# Patient Record
Sex: FEMALE | Race: OTHER | HISPANIC OR LATINO
[De-identification: names, ages, dates, MRNs, and addresses within clinical notes are randomized per-mention and may not be internally consistent; named-entity substitution may affect disease eponyms.]

---

## 2018-01-22 ENCOUNTER — NON-APPOINTMENT (OUTPATIENT)
Age: 28
End: 2018-01-22

## 2018-01-22 ENCOUNTER — APPOINTMENT (OUTPATIENT)
Dept: OBGYN | Facility: CLINIC | Age: 28
End: 2018-01-22
Payer: COMMERCIAL

## 2018-01-22 VITALS
DIASTOLIC BLOOD PRESSURE: 64 MMHG | SYSTOLIC BLOOD PRESSURE: 100 MMHG | HEIGHT: 62 IN | BODY MASS INDEX: 42.33 KG/M2 | WEIGHT: 230 LBS

## 2018-01-22 DIAGNOSIS — Z33.2 ENCOUNTER FOR ELECTIVE TERMINATION OF PREGNANCY: ICD-10-CM

## 2018-01-22 DIAGNOSIS — Z87.19 PERSONAL HISTORY OF OTHER DISEASES OF THE DIGESTIVE SYSTEM: ICD-10-CM

## 2018-01-22 DIAGNOSIS — Z78.9 OTHER SPECIFIED HEALTH STATUS: ICD-10-CM

## 2018-01-22 DIAGNOSIS — Z83.49 FAMILY HISTORY OF OTHER ENDOCRINE, NUTRITIONAL AND METABOLIC DISEASES: ICD-10-CM

## 2018-01-22 DIAGNOSIS — Z83.3 FAMILY HISTORY OF DIABETES MELLITUS: ICD-10-CM

## 2018-01-22 LAB
BILIRUB UR QL STRIP: NORMAL
GLUCOSE UR-MCNC: NORMAL
HCG UR QL: 0.2 EU/DL
HCG UR QL: POSITIVE
HGB UR QL STRIP.AUTO: NORMAL
KETONES UR-MCNC: NORMAL
LEUKOCYTE ESTERASE UR QL STRIP: NORMAL
NITRITE UR QL STRIP: NORMAL
PH UR STRIP: 7
PROT UR STRIP-MCNC: NORMAL
QUALITY CONTROL: YES
SP GR UR STRIP: 1.01

## 2018-01-22 PROCEDURE — 99385 PREV VISIT NEW AGE 18-39: CPT | Mod: 25

## 2018-01-22 PROCEDURE — 76817 TRANSVAGINAL US OBSTETRIC: CPT

## 2018-01-22 PROCEDURE — 81003 URINALYSIS AUTO W/O SCOPE: CPT | Mod: QW

## 2018-01-22 PROCEDURE — 36415 COLL VENOUS BLD VENIPUNCTURE: CPT

## 2018-01-22 PROCEDURE — 81025 URINE PREGNANCY TEST: CPT

## 2018-01-23 LAB
C TRACH RRNA SPEC QL NAA+PROBE: NOT DETECTED
N GONORRHOEA RRNA SPEC QL NAA+PROBE: NOT DETECTED
SOURCE AMPLIFICATION: NORMAL
SOURCE TP AMPLIFICATION: NORMAL
T VAGINALIS RRNA SPEC QL NAA+PROBE: NOT DETECTED

## 2018-01-24 LAB — BACTERIA UR CULT: NORMAL

## 2018-01-27 LAB — CYTOLOGY CVX/VAG DOC THIN PREP: NORMAL

## 2018-02-27 ENCOUNTER — APPOINTMENT (OUTPATIENT)
Dept: OBGYN | Facility: CLINIC | Age: 28
End: 2018-02-27
Payer: COMMERCIAL

## 2018-02-27 VITALS
BODY MASS INDEX: 42.33 KG/M2 | SYSTOLIC BLOOD PRESSURE: 112 MMHG | WEIGHT: 230 LBS | DIASTOLIC BLOOD PRESSURE: 70 MMHG | HEIGHT: 62 IN

## 2018-02-27 DIAGNOSIS — Z32.01 ENCOUNTER FOR PREGNANCY TEST, RESULT POSITIVE: ICD-10-CM

## 2018-02-27 LAB
BILIRUB UR QL STRIP: NORMAL
GLUCOSE UR-MCNC: NORMAL
HCG UR QL: 0.2 EU/DL
HGB UR QL STRIP.AUTO: NORMAL
KETONES UR-MCNC: NORMAL
LEUKOCYTE ESTERASE UR QL STRIP: NORMAL
NITRITE UR QL STRIP: NORMAL
PH UR STRIP: 6
PROT UR STRIP-MCNC: NORMAL
SP GR UR STRIP: 1.01

## 2018-02-27 PROCEDURE — 36415 COLL VENOUS BLD VENIPUNCTURE: CPT

## 2018-02-27 PROCEDURE — 76801 OB US < 14 WKS SINGLE FETUS: CPT

## 2018-02-27 PROCEDURE — 0502F SUBSEQUENT PRENATAL CARE: CPT

## 2018-02-28 ENCOUNTER — NON-APPOINTMENT (OUTPATIENT)
Age: 28
End: 2018-02-28

## 2018-02-28 PROBLEM — Z32.01 POSITIVE PREGNANCY TEST: Status: RESOLVED | Noted: 2018-01-22 | Resolved: 2018-02-28

## 2018-02-28 LAB
ABO + RH PNL BLD: NORMAL
B19V IGG SER QL IA: 6.8 INDEX
B19V IGG+IGM SER-IMP: NORMAL
B19V IGG+IGM SER-IMP: POSITIVE
B19V IGM FLD-ACNC: 0.1 INDEX
B19V IGM SER-ACNC: NEGATIVE
BASOPHILS # BLD AUTO: 0.02 K/UL
BASOPHILS NFR BLD AUTO: 0.3 %
BLD GP AB SCN SERPL QL: NORMAL
EOSINOPHIL # BLD AUTO: 0.12 K/UL
EOSINOPHIL NFR BLD AUTO: 1.7 %
HBV SURFACE AG SERPL QL IA: NONREACTIVE
HCT VFR BLD CALC: 36.4 %
HCV AB SER QL: NONREACTIVE
HCV S/CO RATIO: 0.1 S/CO
HGB BLD-MCNC: 11.5 G/DL
HIV1+2 AB SPEC QL IA.RAPID: NONREACTIVE
IMM GRANULOCYTES NFR BLD AUTO: 0.1 %
LYMPHOCYTES # BLD AUTO: 2.08 K/UL
LYMPHOCYTES NFR BLD AUTO: 29.5 %
MAN DIFF?: NORMAL
MCHC RBC-ENTMCNC: 30.6 PG
MCHC RBC-ENTMCNC: 31.6 GM/DL
MCV RBC AUTO: 96.8 FL
MONOCYTES # BLD AUTO: 0.51 K/UL
MONOCYTES NFR BLD AUTO: 7.2 %
NEUTROPHILS # BLD AUTO: 4.3 K/UL
NEUTROPHILS NFR BLD AUTO: 61.2 %
PLATELET # BLD AUTO: 356 K/UL
RBC # BLD: 3.76 M/UL
RBC # FLD: 14.8 %
RUBV IGG FLD-ACNC: 9.4 INDEX
RUBV IGG SER-IMP: POSITIVE
T PALLIDUM AB SER QL IA: NEGATIVE
TSH SERPL-ACNC: 1.39 UIU/ML
VZV AB TITR SER: POSITIVE
VZV IGG SER IF-ACNC: 2472 INDEX
WBC # FLD AUTO: 7.04 K/UL

## 2018-03-05 LAB
HGB A MFR BLD: 97.4 %
HGB A2 MFR BLD: 2.6 %
HGB FRACT BLD-IMP: NORMAL
LEAD BLD-MCNC: <1 UG/DL
ZIKA PCR SERUM: NOT DETECTED

## 2018-04-14 ENCOUNTER — APPOINTMENT (OUTPATIENT)
Dept: OBGYN | Facility: CLINIC | Age: 28
End: 2018-04-14
Payer: COMMERCIAL

## 2018-04-14 ENCOUNTER — NON-APPOINTMENT (OUTPATIENT)
Age: 28
End: 2018-04-14

## 2018-04-14 VITALS
WEIGHT: 228 LBS | HEIGHT: 62 IN | DIASTOLIC BLOOD PRESSURE: 74 MMHG | BODY MASS INDEX: 41.96 KG/M2 | SYSTOLIC BLOOD PRESSURE: 106 MMHG

## 2018-04-14 DIAGNOSIS — Z34.81 ENCOUNTER FOR SUPERVISION OF OTHER NORMAL PREGNANCY, FIRST TRIMESTER: ICD-10-CM

## 2018-04-14 LAB
BILIRUB UR QL STRIP: NORMAL
GLUCOSE UR-MCNC: NORMAL
HCG UR QL: 0.2 EU/DL
HGB UR QL STRIP.AUTO: NORMAL
KETONES UR-MCNC: NORMAL
LEUKOCYTE ESTERASE UR QL STRIP: NORMAL
NITRITE UR QL STRIP: NORMAL
PH UR STRIP: 6.5
PROT UR STRIP-MCNC: NORMAL
SP GR UR STRIP: 1.02

## 2018-04-14 PROCEDURE — 0502F SUBSEQUENT PRENATAL CARE: CPT

## 2018-04-14 PROCEDURE — 36415 COLL VENOUS BLD VENIPUNCTURE: CPT

## 2018-04-15 ENCOUNTER — NON-APPOINTMENT (OUTPATIENT)
Age: 28
End: 2018-04-15

## 2018-04-18 LAB
2ND TRIMESTER DATA: NORMAL
AFP PNL SERPL: NORMAL
AFP SERPL-ACNC: NORMAL
CLINICAL BIOCHEMIST REVIEW: NORMAL
NOTES NTD: NORMAL

## 2018-04-30 ENCOUNTER — APPOINTMENT (OUTPATIENT)
Dept: ANTEPARTUM | Facility: CLINIC | Age: 28
End: 2018-04-30
Payer: COMMERCIAL

## 2018-04-30 ENCOUNTER — ASOB RESULT (OUTPATIENT)
Age: 28
End: 2018-04-30

## 2018-04-30 PROCEDURE — 76811 OB US DETAILED SNGL FETUS: CPT

## 2018-05-11 ENCOUNTER — APPOINTMENT (OUTPATIENT)
Dept: ANTEPARTUM | Facility: CLINIC | Age: 28
End: 2018-05-11

## 2018-05-14 ENCOUNTER — NON-APPOINTMENT (OUTPATIENT)
Age: 28
End: 2018-05-14

## 2018-05-14 ENCOUNTER — APPOINTMENT (OUTPATIENT)
Dept: OBGYN | Facility: CLINIC | Age: 28
End: 2018-05-14
Payer: COMMERCIAL

## 2018-05-14 VITALS
SYSTOLIC BLOOD PRESSURE: 120 MMHG | WEIGHT: 233 LBS | HEIGHT: 62 IN | DIASTOLIC BLOOD PRESSURE: 70 MMHG | BODY MASS INDEX: 42.88 KG/M2

## 2018-05-14 PROCEDURE — 0502F SUBSEQUENT PRENATAL CARE: CPT

## 2018-05-19 ENCOUNTER — APPOINTMENT (OUTPATIENT)
Dept: ANTEPARTUM | Facility: CLINIC | Age: 28
End: 2018-05-19
Payer: COMMERCIAL

## 2018-05-19 ENCOUNTER — ASOB RESULT (OUTPATIENT)
Age: 28
End: 2018-05-19

## 2018-05-19 PROCEDURE — 76816 OB US FOLLOW-UP PER FETUS: CPT

## 2018-06-11 ENCOUNTER — APPOINTMENT (OUTPATIENT)
Dept: OBGYN | Facility: CLINIC | Age: 28
End: 2018-06-11
Payer: COMMERCIAL

## 2018-06-11 ENCOUNTER — NON-APPOINTMENT (OUTPATIENT)
Age: 28
End: 2018-06-11

## 2018-06-11 VITALS
HEIGHT: 62 IN | WEIGHT: 234 LBS | DIASTOLIC BLOOD PRESSURE: 70 MMHG | SYSTOLIC BLOOD PRESSURE: 110 MMHG | BODY MASS INDEX: 43.06 KG/M2

## 2018-06-11 LAB
BASOPHILS # BLD AUTO: 0.01 K/UL
BASOPHILS NFR BLD AUTO: 0.2 %
BILIRUB UR QL STRIP: NORMAL
EOSINOPHIL # BLD AUTO: 0.13 K/UL
EOSINOPHIL NFR BLD AUTO: 2.1 %
GLUCOSE BS SERPL-MCNC: 81 MG/DL
GLUCOSE UR-MCNC: NORMAL
HCG UR QL: 0.2 EU/DL
HCT VFR BLD CALC: 32.6 %
HGB BLD-MCNC: 10.1 G/DL
HGB UR QL STRIP.AUTO: NORMAL
IMM GRANULOCYTES NFR BLD AUTO: 0.3 %
KETONES UR-MCNC: NORMAL
LEUKOCYTE ESTERASE UR QL STRIP: NORMAL
LYMPHOCYTES # BLD AUTO: 1.82 K/UL
LYMPHOCYTES NFR BLD AUTO: 28.7 %
MAN DIFF?: NORMAL
MCHC RBC-ENTMCNC: 30.4 PG
MCHC RBC-ENTMCNC: 31 GM/DL
MCV RBC AUTO: 98.2 FL
MONOCYTES # BLD AUTO: 0.27 K/UL
MONOCYTES NFR BLD AUTO: 4.3 %
NEUTROPHILS # BLD AUTO: 4.09 K/UL
NEUTROPHILS NFR BLD AUTO: 64.4 %
NITRITE UR QL STRIP: NORMAL
PH UR STRIP: 6
PLATELET # BLD AUTO: 327 K/UL
PROT UR STRIP-MCNC: NORMAL
RBC # BLD: 3.32 M/UL
RBC # FLD: 14.5 %
SP GR UR STRIP: 1.02
WBC # FLD AUTO: 6.34 K/UL

## 2018-06-11 PROCEDURE — 81003 URINALYSIS AUTO W/O SCOPE: CPT | Mod: QW

## 2018-06-11 PROCEDURE — 36415 COLL VENOUS BLD VENIPUNCTURE: CPT

## 2018-06-11 PROCEDURE — 0502F SUBSEQUENT PRENATAL CARE: CPT

## 2018-07-09 ENCOUNTER — NON-APPOINTMENT (OUTPATIENT)
Age: 28
End: 2018-07-09

## 2018-07-09 ENCOUNTER — APPOINTMENT (OUTPATIENT)
Dept: OBGYN | Facility: CLINIC | Age: 28
End: 2018-07-09
Payer: COMMERCIAL

## 2018-07-09 VITALS
DIASTOLIC BLOOD PRESSURE: 70 MMHG | BODY MASS INDEX: 43.06 KG/M2 | SYSTOLIC BLOOD PRESSURE: 102 MMHG | WEIGHT: 234 LBS | HEIGHT: 62 IN

## 2018-07-09 DIAGNOSIS — Z34.92 ENCOUNTER FOR SUPERVISION OF NORMAL PREGNANCY, UNSPECIFIED, SECOND TRIMESTER: ICD-10-CM

## 2018-07-09 LAB
BILIRUB UR QL STRIP: NORMAL
GLUCOSE UR-MCNC: NORMAL
HCG UR QL: 0.2 EU/DL
HGB UR QL STRIP.AUTO: NORMAL
KETONES UR-MCNC: NORMAL
LEUKOCYTE ESTERASE UR QL STRIP: NORMAL
NITRITE UR QL STRIP: NORMAL
PH UR STRIP: 7
PROT UR STRIP-MCNC: NORMAL
SP GR UR STRIP: 1.02

## 2018-07-09 PROCEDURE — 0502F SUBSEQUENT PRENATAL CARE: CPT

## 2018-07-10 ENCOUNTER — NON-APPOINTMENT (OUTPATIENT)
Age: 28
End: 2018-07-10

## 2018-07-19 ENCOUNTER — OUTPATIENT (OUTPATIENT)
Dept: OUTPATIENT SERVICES | Facility: HOSPITAL | Age: 28
LOS: 1 days | End: 2018-07-19
Payer: COMMERCIAL

## 2018-07-19 DIAGNOSIS — O26.899 OTHER SPECIFIED PREGNANCY RELATED CONDITIONS, UNSPECIFIED TRIMESTER: ICD-10-CM

## 2018-07-19 DIAGNOSIS — Z3A.00 WEEKS OF GESTATION OF PREGNANCY NOT SPECIFIED: ICD-10-CM

## 2018-07-19 LAB
APPEARANCE UR: CLEAR — SIGNIFICANT CHANGE UP
BACTERIA # UR AUTO: SIGNIFICANT CHANGE UP
BILIRUB UR-MCNC: NEGATIVE — SIGNIFICANT CHANGE UP
BLOOD UR QL VISUAL: HIGH
COLOR SPEC: YELLOW — SIGNIFICANT CHANGE UP
GLUCOSE UR-MCNC: NEGATIVE — SIGNIFICANT CHANGE UP
KETONES UR-MCNC: SIGNIFICANT CHANGE UP
LEUKOCYTE ESTERASE UR-ACNC: NEGATIVE — SIGNIFICANT CHANGE UP
NITRITE UR-MCNC: NEGATIVE — SIGNIFICANT CHANGE UP
NON-SQ EPI CELLS # UR AUTO: <1 — SIGNIFICANT CHANGE UP
PH UR: 6.5 — SIGNIFICANT CHANGE UP (ref 4.6–8)
PROT UR-MCNC: 20 MG/DL — SIGNIFICANT CHANGE UP
RBC CASTS # UR COMP ASSIST: SIGNIFICANT CHANGE UP (ref 0–?)
SP GR SPEC: 1.02 — SIGNIFICANT CHANGE UP (ref 1–1.04)
SQUAMOUS # UR AUTO: SIGNIFICANT CHANGE UP
UROBILINOGEN FLD QL: NORMAL MG/DL — SIGNIFICANT CHANGE UP
WBC UR QL: SIGNIFICANT CHANGE UP (ref 0–?)

## 2018-07-19 PROCEDURE — 76815 OB US LIMITED FETUS(S): CPT | Mod: 26

## 2018-07-19 PROCEDURE — 99203 OFFICE O/P NEW LOW 30 MIN: CPT | Mod: 25

## 2018-07-19 PROCEDURE — 76817 TRANSVAGINAL US OBSTETRIC: CPT | Mod: 26

## 2018-07-21 ENCOUNTER — ASOB RESULT (OUTPATIENT)
Age: 28
End: 2018-07-21

## 2018-07-21 ENCOUNTER — APPOINTMENT (OUTPATIENT)
Dept: ANTEPARTUM | Facility: CLINIC | Age: 28
End: 2018-07-21
Payer: COMMERCIAL

## 2018-07-21 PROCEDURE — 76816 OB US FOLLOW-UP PER FETUS: CPT

## 2018-07-23 ENCOUNTER — NON-APPOINTMENT (OUTPATIENT)
Age: 28
End: 2018-07-23

## 2018-07-23 ENCOUNTER — APPOINTMENT (OUTPATIENT)
Dept: OBGYN | Facility: CLINIC | Age: 28
End: 2018-07-23
Payer: COMMERCIAL

## 2018-07-23 VITALS
DIASTOLIC BLOOD PRESSURE: 68 MMHG | SYSTOLIC BLOOD PRESSURE: 100 MMHG | HEIGHT: 62 IN | BODY MASS INDEX: 43.79 KG/M2 | WEIGHT: 238 LBS

## 2018-07-23 PROCEDURE — 0502F SUBSEQUENT PRENATAL CARE: CPT

## 2018-08-03 ENCOUNTER — APPOINTMENT (OUTPATIENT)
Dept: OBGYN | Facility: CLINIC | Age: 28
End: 2018-08-03
Payer: COMMERCIAL

## 2018-08-03 ENCOUNTER — NON-APPOINTMENT (OUTPATIENT)
Age: 28
End: 2018-08-03

## 2018-08-03 VITALS
HEIGHT: 62 IN | WEIGHT: 242 LBS | DIASTOLIC BLOOD PRESSURE: 70 MMHG | BODY MASS INDEX: 44.53 KG/M2 | SYSTOLIC BLOOD PRESSURE: 118 MMHG

## 2018-08-03 PROCEDURE — 0502F SUBSEQUENT PRENATAL CARE: CPT

## 2018-08-18 ENCOUNTER — OUTPATIENT (OUTPATIENT)
Dept: OUTPATIENT SERVICES | Facility: HOSPITAL | Age: 28
LOS: 1 days | End: 2018-08-18
Payer: COMMERCIAL

## 2018-08-18 DIAGNOSIS — O26.899 OTHER SPECIFIED PREGNANCY RELATED CONDITIONS, UNSPECIFIED TRIMESTER: ICD-10-CM

## 2018-08-18 DIAGNOSIS — Z3A.00 WEEKS OF GESTATION OF PREGNANCY NOT SPECIFIED: ICD-10-CM

## 2018-08-18 LAB
APPEARANCE UR: CLEAR — SIGNIFICANT CHANGE UP
BACTERIA # UR AUTO: SIGNIFICANT CHANGE UP
BILIRUB UR-MCNC: NEGATIVE — SIGNIFICANT CHANGE UP
BLOOD UR QL VISUAL: NEGATIVE — SIGNIFICANT CHANGE UP
COLOR SPEC: YELLOW — SIGNIFICANT CHANGE UP
GLUCOSE UR-MCNC: NEGATIVE — SIGNIFICANT CHANGE UP
KETONES UR-MCNC: SIGNIFICANT CHANGE UP
LEUKOCYTE ESTERASE UR-ACNC: NEGATIVE — SIGNIFICANT CHANGE UP
MUCOUS THREADS # UR AUTO: SIGNIFICANT CHANGE UP
NITRITE UR-MCNC: NEGATIVE — SIGNIFICANT CHANGE UP
PH UR: 6.5 — SIGNIFICANT CHANGE UP (ref 5–8)
PROT UR-MCNC: SIGNIFICANT CHANGE UP
RBC CASTS # UR COMP ASSIST: SIGNIFICANT CHANGE UP (ref 0–?)
SP GR SPEC: 1.03 — SIGNIFICANT CHANGE UP (ref 1–1.04)
SQUAMOUS # UR AUTO: SIGNIFICANT CHANGE UP
UROBILINOGEN FLD QL: SIGNIFICANT CHANGE UP
WBC UR QL: HIGH (ref 0–?)

## 2018-08-18 PROCEDURE — 59025 FETAL NON-STRESS TEST: CPT | Mod: 26

## 2018-08-20 ENCOUNTER — NON-APPOINTMENT (OUTPATIENT)
Age: 28
End: 2018-08-20

## 2018-08-20 ENCOUNTER — APPOINTMENT (OUTPATIENT)
Dept: OBGYN | Facility: CLINIC | Age: 28
End: 2018-08-20
Payer: COMMERCIAL

## 2018-08-20 VITALS
SYSTOLIC BLOOD PRESSURE: 100 MMHG | WEIGHT: 245 LBS | DIASTOLIC BLOOD PRESSURE: 60 MMHG | BODY MASS INDEX: 45.08 KG/M2 | HEIGHT: 62 IN

## 2018-08-20 PROCEDURE — 36415 COLL VENOUS BLD VENIPUNCTURE: CPT

## 2018-08-20 PROCEDURE — 0502F SUBSEQUENT PRENATAL CARE: CPT

## 2018-08-21 LAB
BASOPHILS # BLD AUTO: 0.01 K/UL
BASOPHILS NFR BLD AUTO: 0.2 %
EOSINOPHIL # BLD AUTO: 0.07 K/UL
EOSINOPHIL NFR BLD AUTO: 1.3 %
HCT VFR BLD CALC: 34.1 %
HGB BLD-MCNC: 10.2 G/DL
HIV1+2 AB SPEC QL IA.RAPID: NONREACTIVE
IMM GRANULOCYTES NFR BLD AUTO: 0.6 %
LYMPHOCYTES # BLD AUTO: 1.62 K/UL
LYMPHOCYTES NFR BLD AUTO: 30.2 %
MAN DIFF?: NORMAL
MCHC RBC-ENTMCNC: 29 PG
MCHC RBC-ENTMCNC: 29.9 GM/DL
MCV RBC AUTO: 96.9 FL
MONOCYTES # BLD AUTO: 0.29 K/UL
MONOCYTES NFR BLD AUTO: 5.4 %
NEUTROPHILS # BLD AUTO: 3.34 K/UL
NEUTROPHILS NFR BLD AUTO: 62.3 %
PLATELET # BLD AUTO: 276 K/UL
RBC # BLD: 3.52 M/UL
RBC # FLD: 15.6 %
WBC # FLD AUTO: 5.36 K/UL

## 2018-08-22 LAB
GP B STREP DNA SPEC QL NAA+PROBE: NORMAL
GP B STREP DNA SPEC QL NAA+PROBE: NOT DETECTED
SOURCE GBS: NORMAL

## 2018-08-31 ENCOUNTER — APPOINTMENT (OUTPATIENT)
Dept: OBGYN | Facility: CLINIC | Age: 28
End: 2018-08-31
Payer: COMMERCIAL

## 2018-08-31 ENCOUNTER — NON-APPOINTMENT (OUTPATIENT)
Age: 28
End: 2018-08-31

## 2018-08-31 VITALS
SYSTOLIC BLOOD PRESSURE: 120 MMHG | DIASTOLIC BLOOD PRESSURE: 80 MMHG | WEIGHT: 245 LBS | BODY MASS INDEX: 45.08 KG/M2 | HEIGHT: 62 IN

## 2018-08-31 PROCEDURE — 0502F SUBSEQUENT PRENATAL CARE: CPT

## 2018-08-31 RX ORDER — TERCONAZOLE 8 MG/G
0.8 CREAM VAGINAL
Qty: 1 | Refills: 0 | Status: DISCONTINUED | COMMUNITY
Start: 2018-08-06 | End: 2018-08-31

## 2018-08-31 RX ORDER — METRONIDAZOLE 7.5 MG/G
0.75 GEL VAGINAL
Qty: 1 | Refills: 0 | Status: DISCONTINUED | COMMUNITY
Start: 2018-07-23 | End: 2018-08-31

## 2018-09-07 ENCOUNTER — NON-APPOINTMENT (OUTPATIENT)
Age: 28
End: 2018-09-07

## 2018-09-07 ENCOUNTER — APPOINTMENT (OUTPATIENT)
Dept: OBGYN | Facility: CLINIC | Age: 28
End: 2018-09-07
Payer: COMMERCIAL

## 2018-09-07 VITALS
SYSTOLIC BLOOD PRESSURE: 108 MMHG | BODY MASS INDEX: 45.08 KG/M2 | DIASTOLIC BLOOD PRESSURE: 68 MMHG | WEIGHT: 245 LBS | HEIGHT: 62 IN

## 2018-09-07 PROCEDURE — 0502F SUBSEQUENT PRENATAL CARE: CPT

## 2018-09-07 PROCEDURE — 76816 OB US FOLLOW-UP PER FETUS: CPT

## 2018-09-08 ENCOUNTER — NON-APPOINTMENT (OUTPATIENT)
Age: 28
End: 2018-09-08

## 2018-09-14 ENCOUNTER — TRANSCRIPTION ENCOUNTER (OUTPATIENT)
Age: 28
End: 2018-09-14

## 2018-09-14 ENCOUNTER — APPOINTMENT (OUTPATIENT)
Dept: OBGYN | Facility: CLINIC | Age: 28
End: 2018-09-14
Payer: COMMERCIAL

## 2018-09-14 VITALS
DIASTOLIC BLOOD PRESSURE: 80 MMHG | BODY MASS INDEX: 45.27 KG/M2 | HEIGHT: 62 IN | SYSTOLIC BLOOD PRESSURE: 120 MMHG | WEIGHT: 246 LBS

## 2018-09-14 PROCEDURE — 0502F SUBSEQUENT PRENATAL CARE: CPT

## 2018-09-15 ENCOUNTER — INPATIENT (INPATIENT)
Facility: HOSPITAL | Age: 28
LOS: 0 days | Discharge: HOME | End: 2018-09-16
Attending: OBSTETRICS & GYNECOLOGY | Admitting: OBSTETRICS & GYNECOLOGY
Payer: COMMERCIAL

## 2018-09-15 ENCOUNTER — NON-APPOINTMENT (OUTPATIENT)
Age: 28
End: 2018-09-15

## 2018-09-15 VITALS
DIASTOLIC BLOOD PRESSURE: 57 MMHG | RESPIRATION RATE: 16 BRPM | OXYGEN SATURATION: 100 % | TEMPERATURE: 99 F | HEART RATE: 96 BPM | SYSTOLIC BLOOD PRESSURE: 111 MMHG

## 2018-09-15 DIAGNOSIS — O26.899 OTHER SPECIFIED PREGNANCY RELATED CONDITIONS, UNSPECIFIED TRIMESTER: ICD-10-CM

## 2018-09-15 DIAGNOSIS — Z3A.00 WEEKS OF GESTATION OF PREGNANCY NOT SPECIFIED: ICD-10-CM

## 2018-09-15 LAB
BASOPHILS # BLD AUTO: 0.03 K/UL — SIGNIFICANT CHANGE UP (ref 0–0.2)
BASOPHILS NFR BLD AUTO: 0.3 % — SIGNIFICANT CHANGE UP (ref 0–2)
BLD GP AB SCN SERPL QL: NEGATIVE — SIGNIFICANT CHANGE UP
EOSINOPHIL # BLD AUTO: 0.15 K/UL — SIGNIFICANT CHANGE UP (ref 0–0.5)
EOSINOPHIL NFR BLD AUTO: 1.6 % — SIGNIFICANT CHANGE UP (ref 0–6)
HCT VFR BLD CALC: 35 % — SIGNIFICANT CHANGE UP (ref 34.5–45)
HGB BLD-MCNC: 10.8 G/DL — LOW (ref 11.5–15.5)
IMM GRANULOCYTES # BLD AUTO: 0.02 # — SIGNIFICANT CHANGE UP
IMM GRANULOCYTES NFR BLD AUTO: 0.2 % — SIGNIFICANT CHANGE UP (ref 0–1.5)
LYMPHOCYTES # BLD AUTO: 2.42 K/UL — SIGNIFICANT CHANGE UP (ref 1–3.3)
LYMPHOCYTES # BLD AUTO: 26.2 % — SIGNIFICANT CHANGE UP (ref 13–44)
MCHC RBC-ENTMCNC: 29 PG — SIGNIFICANT CHANGE UP (ref 27–34)
MCHC RBC-ENTMCNC: 30.9 % — LOW (ref 32–36)
MCV RBC AUTO: 94.1 FL — SIGNIFICANT CHANGE UP (ref 80–100)
MONOCYTES # BLD AUTO: 0.75 K/UL — SIGNIFICANT CHANGE UP (ref 0–0.9)
MONOCYTES NFR BLD AUTO: 8.1 % — SIGNIFICANT CHANGE UP (ref 2–14)
NEUTROPHILS # BLD AUTO: 5.87 K/UL — SIGNIFICANT CHANGE UP (ref 1.8–7.4)
NEUTROPHILS NFR BLD AUTO: 63.6 % — SIGNIFICANT CHANGE UP (ref 43–77)
NRBC # FLD: 0 — SIGNIFICANT CHANGE UP
PLATELET # BLD AUTO: 284 K/UL — SIGNIFICANT CHANGE UP (ref 150–400)
PMV BLD: 11.1 FL — SIGNIFICANT CHANGE UP (ref 7–13)
RBC # BLD: 3.72 M/UL — LOW (ref 3.8–5.2)
RBC # FLD: 15.2 % — HIGH (ref 10.3–14.5)
RH IG SCN BLD-IMP: POSITIVE — SIGNIFICANT CHANGE UP
RH IG SCN BLD-IMP: POSITIVE — SIGNIFICANT CHANGE UP
WBC # BLD: 9.24 K/UL — SIGNIFICANT CHANGE UP (ref 3.8–10.5)
WBC # FLD AUTO: 9.24 K/UL — SIGNIFICANT CHANGE UP (ref 3.8–10.5)

## 2018-09-15 PROCEDURE — 59400 OBSTETRICAL CARE: CPT

## 2018-09-15 RX ORDER — AER TRAVELER 0.5 G/1
1 SOLUTION RECTAL; TOPICAL EVERY 4 HOURS
Qty: 0 | Refills: 0 | Status: DISCONTINUED | OUTPATIENT
Start: 2018-09-15 | End: 2018-09-15

## 2018-09-15 RX ORDER — OXYCODONE HYDROCHLORIDE 5 MG/1
5 TABLET ORAL EVERY 4 HOURS
Qty: 0 | Refills: 0 | Status: DISCONTINUED | OUTPATIENT
Start: 2018-09-15 | End: 2018-09-16

## 2018-09-15 RX ORDER — ACETAMINOPHEN 500 MG
975 TABLET ORAL EVERY 6 HOURS
Qty: 0 | Refills: 0 | Status: COMPLETED | OUTPATIENT
Start: 2018-09-15 | End: 2019-08-14

## 2018-09-15 RX ORDER — OXYTOCIN 10 UNIT/ML
41.67 VIAL (ML) INJECTION
Qty: 20 | Refills: 0 | Status: DISCONTINUED | OUTPATIENT
Start: 2018-09-15 | End: 2018-09-15

## 2018-09-15 RX ORDER — MAGNESIUM HYDROXIDE 400 MG/1
30 TABLET, CHEWABLE ORAL
Qty: 0 | Refills: 0 | Status: DISCONTINUED | OUTPATIENT
Start: 2018-09-15 | End: 2018-09-16

## 2018-09-15 RX ORDER — INFLUENZA VIRUS VACCINE 15; 15; 15; 15 UG/.5ML; UG/.5ML; UG/.5ML; UG/.5ML
0.5 SUSPENSION INTRAMUSCULAR ONCE
Qty: 0 | Refills: 0 | Status: DISCONTINUED | OUTPATIENT
Start: 2018-09-15 | End: 2018-09-16

## 2018-09-15 RX ORDER — ACETAMINOPHEN 500 MG
975 TABLET ORAL EVERY 6 HOURS
Qty: 0 | Refills: 0 | Status: DISCONTINUED | OUTPATIENT
Start: 2018-09-15 | End: 2018-09-16

## 2018-09-15 RX ORDER — PRAMOXINE HYDROCHLORIDE 150 MG/15G
1 AEROSOL, FOAM RECTAL EVERY 4 HOURS
Qty: 0 | Refills: 0 | Status: DISCONTINUED | OUTPATIENT
Start: 2018-09-15 | End: 2018-09-15

## 2018-09-15 RX ORDER — OXYTOCIN 10 UNIT/ML
333.33 VIAL (ML) INJECTION
Qty: 20 | Refills: 0 | Status: DISCONTINUED | OUTPATIENT
Start: 2018-09-15 | End: 2018-09-15

## 2018-09-15 RX ORDER — DIBUCAINE 1 %
1 OINTMENT (GRAM) RECTAL EVERY 4 HOURS
Qty: 0 | Refills: 0 | Status: DISCONTINUED | OUTPATIENT
Start: 2018-09-15 | End: 2018-09-16

## 2018-09-15 RX ORDER — SODIUM CHLORIDE 9 MG/ML
3 INJECTION INTRAMUSCULAR; INTRAVENOUS; SUBCUTANEOUS EVERY 8 HOURS
Qty: 0 | Refills: 0 | Status: DISCONTINUED | OUTPATIENT
Start: 2018-09-15 | End: 2018-09-15

## 2018-09-15 RX ORDER — DIBUCAINE 1 %
1 OINTMENT (GRAM) RECTAL EVERY 4 HOURS
Qty: 0 | Refills: 0 | Status: DISCONTINUED | OUTPATIENT
Start: 2018-09-15 | End: 2018-09-15

## 2018-09-15 RX ORDER — HYDROCORTISONE 1 %
1 OINTMENT (GRAM) TOPICAL EVERY 4 HOURS
Qty: 0 | Refills: 0 | Status: DISCONTINUED | OUTPATIENT
Start: 2018-09-15 | End: 2018-09-16

## 2018-09-15 RX ORDER — SODIUM CHLORIDE 9 MG/ML
1000 INJECTION, SOLUTION INTRAVENOUS ONCE
Qty: 0 | Refills: 0 | Status: DISCONTINUED | OUTPATIENT
Start: 2018-09-15 | End: 2018-09-15

## 2018-09-15 RX ORDER — OXYCODONE AND ACETAMINOPHEN 5; 325 MG/1; MG/1
2 TABLET ORAL EVERY 6 HOURS
Qty: 0 | Refills: 0 | Status: DISCONTINUED | OUTPATIENT
Start: 2018-09-15 | End: 2018-09-15

## 2018-09-15 RX ORDER — SIMETHICONE 80 MG/1
80 TABLET, CHEWABLE ORAL EVERY 6 HOURS
Qty: 0 | Refills: 0 | Status: DISCONTINUED | OUTPATIENT
Start: 2018-09-15 | End: 2018-09-16

## 2018-09-15 RX ORDER — IBUPROFEN 200 MG
600 TABLET ORAL EVERY 6 HOURS
Qty: 0 | Refills: 0 | Status: DISCONTINUED | OUTPATIENT
Start: 2018-09-15 | End: 2018-09-15

## 2018-09-15 RX ORDER — SODIUM CHLORIDE 9 MG/ML
3 INJECTION INTRAMUSCULAR; INTRAVENOUS; SUBCUTANEOUS EVERY 8 HOURS
Qty: 0 | Refills: 0 | Status: DISCONTINUED | OUTPATIENT
Start: 2018-09-15 | End: 2018-09-16

## 2018-09-15 RX ORDER — IBUPROFEN 200 MG
600 TABLET ORAL EVERY 6 HOURS
Qty: 0 | Refills: 0 | Status: COMPLETED | OUTPATIENT
Start: 2018-09-15 | End: 2019-08-14

## 2018-09-15 RX ORDER — FERROUS SULFATE 325(65) MG
1 TABLET ORAL
Qty: 0 | Refills: 0 | COMMUNITY

## 2018-09-15 RX ORDER — HYDROCORTISONE 1 %
1 OINTMENT (GRAM) TOPICAL EVERY 4 HOURS
Qty: 0 | Refills: 0 | Status: DISCONTINUED | OUTPATIENT
Start: 2018-09-15 | End: 2018-09-15

## 2018-09-15 RX ORDER — PRAMOXINE HYDROCHLORIDE 150 MG/15G
1 AEROSOL, FOAM RECTAL EVERY 4 HOURS
Qty: 0 | Refills: 0 | Status: DISCONTINUED | OUTPATIENT
Start: 2018-09-15 | End: 2018-09-16

## 2018-09-15 RX ORDER — IBUPROFEN 200 MG
600 TABLET ORAL EVERY 6 HOURS
Qty: 0 | Refills: 0 | Status: DISCONTINUED | OUTPATIENT
Start: 2018-09-15 | End: 2018-09-16

## 2018-09-15 RX ORDER — OXYTOCIN 10 UNIT/ML
41.67 VIAL (ML) INJECTION
Qty: 20 | Refills: 0 | Status: DISCONTINUED | OUTPATIENT
Start: 2018-09-15 | End: 2018-09-16

## 2018-09-15 RX ORDER — AER TRAVELER 0.5 G/1
1 SOLUTION RECTAL; TOPICAL EVERY 4 HOURS
Qty: 0 | Refills: 0 | Status: DISCONTINUED | OUTPATIENT
Start: 2018-09-15 | End: 2018-09-16

## 2018-09-15 RX ORDER — DIPHENHYDRAMINE HCL 50 MG
25 CAPSULE ORAL EVERY 6 HOURS
Qty: 0 | Refills: 0 | Status: DISCONTINUED | OUTPATIENT
Start: 2018-09-15 | End: 2018-09-16

## 2018-09-15 RX ORDER — TETANUS TOXOID, REDUCED DIPHTHERIA TOXOID AND ACELLULAR PERTUSSIS VACCINE, ADSORBED 5; 2.5; 8; 8; 2.5 [IU]/.5ML; [IU]/.5ML; UG/.5ML; UG/.5ML; UG/.5ML
0.5 SUSPENSION INTRAMUSCULAR ONCE
Qty: 0 | Refills: 0 | Status: DISCONTINUED | OUTPATIENT
Start: 2018-09-15 | End: 2018-09-16

## 2018-09-15 RX ORDER — DOCUSATE SODIUM 100 MG
100 CAPSULE ORAL
Qty: 0 | Refills: 0 | Status: DISCONTINUED | OUTPATIENT
Start: 2018-09-15 | End: 2018-09-16

## 2018-09-15 RX ORDER — SODIUM CHLORIDE 9 MG/ML
1000 INJECTION, SOLUTION INTRAVENOUS
Qty: 0 | Refills: 0 | Status: DISCONTINUED | OUTPATIENT
Start: 2018-09-15 | End: 2018-09-15

## 2018-09-15 RX ORDER — GLYCERIN ADULT
1 SUPPOSITORY, RECTAL RECTAL AT BEDTIME
Qty: 0 | Refills: 0 | Status: DISCONTINUED | OUTPATIENT
Start: 2018-09-15 | End: 2018-09-16

## 2018-09-15 RX ORDER — KETOROLAC TROMETHAMINE 30 MG/ML
30 SYRINGE (ML) INJECTION ONCE
Qty: 0 | Refills: 0 | Status: DISCONTINUED | OUTPATIENT
Start: 2018-09-15 | End: 2018-09-15

## 2018-09-15 RX ORDER — LANOLIN
1 OINTMENT (GRAM) TOPICAL EVERY 6 HOURS
Qty: 0 | Refills: 0 | Status: DISCONTINUED | OUTPATIENT
Start: 2018-09-15 | End: 2018-09-16

## 2018-09-15 RX ADMIN — SODIUM CHLORIDE 3 MILLILITER(S): 9 INJECTION INTRAMUSCULAR; INTRAVENOUS; SUBCUTANEOUS at 14:17

## 2018-09-15 RX ADMIN — Medication 600 MILLIGRAM(S): at 12:21

## 2018-09-15 RX ADMIN — Medication 975 MILLIGRAM(S): at 12:20

## 2018-09-15 RX ADMIN — Medication 30 MILLIGRAM(S): at 04:00

## 2018-09-15 RX ADMIN — Medication 30 MILLIGRAM(S): at 03:34

## 2018-09-15 RX ADMIN — AER TRAVELER 1 APPLICATION(S): 0.5 SOLUTION RECTAL; TOPICAL at 20:47

## 2018-09-15 RX ADMIN — Medication 600 MILLIGRAM(S): at 12:50

## 2018-09-15 RX ADMIN — Medication 975 MILLIGRAM(S): at 12:50

## 2018-09-15 RX ADMIN — Medication 125 MILLIUNIT(S)/MIN: at 03:34

## 2018-09-15 RX ADMIN — Medication 1 TABLET(S): at 12:20

## 2018-09-15 RX ADMIN — OXYCODONE HYDROCHLORIDE 5 MILLIGRAM(S): 5 TABLET ORAL at 05:50

## 2018-09-15 RX ADMIN — PRAMOXINE HYDROCHLORIDE 1 APPLICATION(S): 150 AEROSOL, FOAM RECTAL at 20:47

## 2018-09-15 RX ADMIN — OXYCODONE HYDROCHLORIDE 5 MILLIGRAM(S): 5 TABLET ORAL at 05:35

## 2018-09-15 RX ADMIN — Medication 1 APPLICATION(S): at 20:47

## 2018-09-15 NOTE — DISCHARGE NOTE OB - PATIENT PORTAL LINK FT
You can access the [facility] Patient Portal, offered by [facility], by registering with the following website: [email or web address]

## 2018-09-15 NOTE — DISCHARGE NOTE OB - MEDICATION SUMMARY - MEDICATIONS TO STOP TAKING
I will STOP taking the medications listed below when I get home from the hospital:    omeprazole 20 mg oral delayed release capsule  -- 1 cap(s) by mouth once a day, As Needed    acetaminophen-oxyCODONE 325 mg-5 mg oral tablet  -- 2 tab(s) by mouth every 4 hours, As needed, Severe Pain

## 2018-09-15 NOTE — DISCHARGE NOTE OB - MATERIALS PROVIDED
Immunization Record/Back To Sleep Handout/Guide to Postpartum Care/[facility] Screening Program/Bottle Feeding Log/Discharge Medication Information for Patients and Families Pocket Guide/[facility] Hearing Screen Program/Vaccinations/Shaken Baby Prevention Handout

## 2018-09-15 NOTE — DISCHARGE NOTE OB - CARE PROVIDERS DIRECT ADDRESSES
[email or web address]

## 2018-09-15 NOTE — DISCHARGE NOTE OB - MEDICATION SUMMARY - MEDICATIONS TO TAKE
I will START or STAY ON the medications listed below when I get home from the hospital:    prenatal vitamins  -- 1 tab(s) by mouth once a day  -- Indication: For Vaginal delivery    ibuprofen 600 mg oral tablet  -- 1 tab(s) by mouth every 6 hours  -- Indication: For Vaginal delivery

## 2018-09-15 NOTE — DISCHARGE NOTE OB - CARE PROVIDER_API CALL
Ramirez Nielsen), Obstetrics and Gynecology  [address]  [address]nd [place] [address]  Phone: [phone]  Fax: [phone]

## 2018-09-15 NOTE — DISCHARGE NOTE OB - CARE PLAN
Principal Discharge DX:	Vaginal delivery  Goal:	fully recovered  Assessment and plan of treatment:	dr lopez 6 weeks Principal Discharge DX:	Vaginal delivery  Goal:	fully recovered  Assessment and plan of treatment:	dr vogel 4 weeks

## 2018-09-16 VITALS
DIASTOLIC BLOOD PRESSURE: 60 MMHG | OXYGEN SATURATION: 98 % | HEART RATE: 66 BPM | RESPIRATION RATE: 16 BRPM | SYSTOLIC BLOOD PRESSURE: 103 MMHG | TEMPERATURE: 98 F

## 2018-09-16 LAB — T PALLIDUM AB TITR SER: NEGATIVE — SIGNIFICANT CHANGE UP

## 2018-09-16 RX ORDER — HYDROCORTISONE 1 %
1 OINTMENT (GRAM) TOPICAL EVERY 4 HOURS
Qty: 0 | Refills: 0 | Status: DISCONTINUED | OUTPATIENT
Start: 2018-09-16 | End: 2018-09-16

## 2018-09-16 RX ORDER — IBUPROFEN 200 MG
1 TABLET ORAL
Qty: 0 | Refills: 0 | DISCHARGE
Start: 2018-09-16

## 2018-09-16 RX ORDER — PRAMOXINE HYDROCHLORIDE 150 MG/15G
1 AEROSOL, FOAM RECTAL EVERY 4 HOURS
Qty: 0 | Refills: 0 | Status: DISCONTINUED | OUTPATIENT
Start: 2018-09-16 | End: 2018-09-16

## 2018-09-16 RX ADMIN — Medication 975 MILLIGRAM(S): at 12:12

## 2018-09-16 RX ADMIN — Medication 600 MILLIGRAM(S): at 00:55

## 2018-09-16 RX ADMIN — Medication 975 MILLIGRAM(S): at 12:48

## 2018-09-16 RX ADMIN — Medication 975 MILLIGRAM(S): at 00:55

## 2018-09-16 RX ADMIN — Medication 975 MILLIGRAM(S): at 07:05

## 2018-09-16 RX ADMIN — Medication 600 MILLIGRAM(S): at 07:05

## 2018-09-16 RX ADMIN — Medication 600 MILLIGRAM(S): at 12:12

## 2018-09-16 RX ADMIN — Medication 600 MILLIGRAM(S): at 06:41

## 2018-09-16 RX ADMIN — Medication 975 MILLIGRAM(S): at 06:41

## 2018-09-16 RX ADMIN — Medication 600 MILLIGRAM(S): at 12:48

## 2018-09-16 RX ADMIN — Medication 1 TABLET(S): at 12:16

## 2018-09-16 RX ADMIN — Medication 600 MILLIGRAM(S): at 00:11

## 2018-09-16 RX ADMIN — Medication 975 MILLIGRAM(S): at 00:10

## 2018-09-16 NOTE — PROGRESS NOTE ADULT - SUBJECTIVE AND OBJECTIVE BOX
SUBJECTIVE:    Pain: Controlled    Complaints: None    MILESTONES:     Alert and oriented x 3  [x  ]  Out of bed /ambulating [  x]    Voiding [x  ]  Due to void [  ]    Tolerating a regular diet.    Infant feeding:   Breast [  ]   Bottle [ X ]     Both [  ]                         Feeding related issues or concerns:    Objective   T(C): 36.9 (18 @ 05:51), Max: 37.1 (09-15-18 @ 13:59)  HR: 66 (18 @ 05:51) (66 - 79)  BP: 103/60 (18 @ 05:51) (93/50 - 108/63)  RR: 16 (18 @ 05:51) (16 - 18)  SpO2: 98% (18 @ 05:51) (98% - 100%)  Wt(kg): --                        10.8   9.24  )-----------( 284      ( 15 Sep 2018 02:40 )             35.0         Blood Type: O Positive    RPR: Negative          MEDICATIONS  (STANDING):  acetaminophen   Tablet .. 975 milliGRAM(s) Oral every 6 hours  diphtheria/tetanus/pertussis (acellular) Vaccine (ADAcel) 0.5 milliLiter(s) IntraMuscular once  ibuprofen  Tablet. 600 milliGRAM(s) Oral every 6 hours  influenza   Vaccine 0.5 milliLiter(s) IntraMuscular once  prenatal multivitamin 1 Tablet(s) Oral daily    MEDICATIONS  (PRN):  dibucaine 1% Ointment 1 Application(s) Topical every 4 hours PRN Perineal Discomfort  diphenhydrAMINE   Capsule 25 milliGRAM(s) Oral every 6 hours PRN Itching  docusate sodium 100 milliGRAM(s) Oral two times a day PRN Stool Softening  glycerin Suppository - Adult 1 Suppository(s) Rectal at bedtime PRN Constipation  hydrocortisone 1% Cream 1 Application(s) Topical every 4 hours PRN perineal discomfort  lanolin Ointment 1 Application(s) Topical every 6 hours PRN Sore Nipples  magnesium hydroxide Suspension 30 milliLiter(s) Oral two times a day PRN Constipation  oxyCODONE    IR 5 milliGRAM(s) Oral every 4 hours PRN Severe Pain (7 -10)  pramoxine 1%/zinc 5% Cream 1 Application(s) Topical every 4 hours PRN perineal discomfort  simethicone 80 milliGRAM(s) Chew every 6 hours PRN Gas  witch hazel Pads 1 Application(s) Topical every 4 hours PRN Perineal Discomfort      ASSESSMENT:      27y      G 4    P 3013       PP Day #   1    Delivery:   [ X ]             [   ]      EBL - 350    Assisted delivery  :    Vacuum   [  ]   Forceps)  [  ]    Indication for assisted delivery: Tracing Abn [  ]     Maternal Exhaustion [  ]     Other [  ]    Past medical history significant for HPI:  Hx. Earl [place] - 2016      Current Issues:     Breasts: Soft [x  ]    Engorged [  ]   Patient to wear a good support bra, as she is not breastfeeding.  Nipples:  Abdomen: Soft [ x ]  Nontender [  ]  Nondistended [  ]   Distended [  ]    Vagina: Lochia   Mod [x  ]      Scant [  ]  Heavy  [  ]    Perineum:  Intact [ X ]   Laceration   [  ]   Type of laceration [          ]    Episiotomy [  ]    Type of episiotomy  [              ]    Lower extremities: Edema  [ X ] noted bilaterally .  Nontender Jon  [  ]  Negative Niles's sign [  ] Positive pedal pulses [  ]    Other relevant physical exam findings;      PLAN:    Plan: Increase ambulation, analgesia PRN and pain medication  protocol standing oxycodone, ibuprofen and acetaminophen.    Diet: Continue regular diet    Labs:    Continue routine postpartum care.  Patient requested discharge today PP day # 1. Discussed with Dr. Dominguez.  Patient for discharge. Follow up in 4 weeks, as she plans to return to work before the 6 weeks postpartum check up.    Discharge Planning [x  ]    For  Discharge Today  [ X  ]    Consults :  Social Work [  ]     Lactation [x  ]    Other [      ]

## 2018-09-16 NOTE — PROGRESS NOTE ADULT - ATTENDING COMMENTS
Discussed plan of care with Kenya Ambriz and agree with above assessment and plan.  Pt for discharge home today with follow up in the office

## 2018-09-21 ENCOUNTER — APPOINTMENT (OUTPATIENT)
Dept: OBGYN | Facility: CLINIC | Age: 28
End: 2018-09-21

## 2018-11-02 ENCOUNTER — APPOINTMENT (OUTPATIENT)
Dept: OBGYN | Facility: CLINIC | Age: 28
End: 2018-11-02

## 2018-11-27 ENCOUNTER — APPOINTMENT (OUTPATIENT)
Dept: OBGYN | Facility: CLINIC | Age: 28
End: 2018-11-27
Payer: COMMERCIAL

## 2018-11-27 VITALS
WEIGHT: 223 LBS | HEIGHT: 62 IN | DIASTOLIC BLOOD PRESSURE: 64 MMHG | SYSTOLIC BLOOD PRESSURE: 106 MMHG | BODY MASS INDEX: 41.04 KG/M2

## 2018-11-27 DIAGNOSIS — B37.9 CANDIDIASIS, UNSPECIFIED: ICD-10-CM

## 2018-11-27 DIAGNOSIS — O36.80X0 PREGNANCY WITH INCONCLUSIVE FETAL VIABILITY, NOT APPLICABLE OR UNSPECIFIED: ICD-10-CM

## 2018-11-27 DIAGNOSIS — E66.01 OBESITY COMPLICATING PREGNANCY, UNSPECIFIED TRIMESTER: ICD-10-CM

## 2018-11-27 DIAGNOSIS — Z20.821 CONTACT WITH AND (SUSPECTED) EXPOSURE TO ZIKA VIRUS: ICD-10-CM

## 2018-11-27 DIAGNOSIS — O99.019 ANEMIA COMPLICATING PREGNANCY, UNSPECIFIED TRIMESTER: ICD-10-CM

## 2018-11-27 DIAGNOSIS — Z01.419 ENCOUNTER FOR GYNECOLOGICAL EXAMINATION (GENERAL) (ROUTINE) W/OUT ABNORMAL FINDINGS: ICD-10-CM

## 2018-11-27 DIAGNOSIS — Z34.93 ENCOUNTER FOR SUPERVISION OF NORMAL PREGNANCY, UNSPECIFIED, THIRD TRIMESTER: ICD-10-CM

## 2018-11-27 DIAGNOSIS — O99.210 OBESITY COMPLICATING PREGNANCY, UNSPECIFIED TRIMESTER: ICD-10-CM

## 2018-11-27 DIAGNOSIS — O26.849 UTERINE SIZE-DATE DISCREPANCY, UNSPECIFIED TRIMESTER: ICD-10-CM

## 2018-11-27 DIAGNOSIS — O21.9 VOMITING OF PREGNANCY, UNSPECIFIED: ICD-10-CM

## 2018-11-27 PROCEDURE — 99213 OFFICE O/P EST LOW 20 MIN: CPT

## 2018-11-30 PROBLEM — O36.80X0 PREGNANCY WITH UNCERTAIN FETAL VIABILITY: Status: RESOLVED | Noted: 2018-02-28 | Resolved: 2018-11-30

## 2018-11-30 PROBLEM — B37.9 YEAST INFECTION: Status: RESOLVED | Noted: 2018-08-06 | Resolved: 2018-11-30

## 2018-11-30 PROBLEM — O26.849 UTERINE SIZE DATE DISCREPANCY, ANTEPARTUM: Status: RESOLVED | Noted: 2018-08-31 | Resolved: 2018-11-30

## 2018-11-30 PROBLEM — O99.019 ANEMIA AFFECTING PREGNANCY: Status: RESOLVED | Noted: 2018-06-12 | Resolved: 2018-11-30

## 2018-11-30 PROBLEM — O21.9 NAUSEA AND VOMITING OF PREGNANCY, ANTEPARTUM: Status: RESOLVED | Noted: 2018-01-22 | Resolved: 2018-11-30

## 2018-11-30 PROBLEM — Z20.821 EXPOSURE TO ZIKA VIRUS: Status: RESOLVED | Noted: 2018-02-27 | Resolved: 2018-11-30

## 2018-11-30 PROBLEM — O99.210 MATERNAL MORBID OBESITY, ANTEPARTUM: Status: RESOLVED | Noted: 2018-07-09 | Resolved: 2018-11-30

## 2018-11-30 PROBLEM — Z34.93 ENCOUNTER FOR PREGNANCY RELATED EXAMINATION IN THIRD TRIMESTER: Status: RESOLVED | Noted: 2018-07-09 | Resolved: 2018-11-30

## 2018-11-30 PROBLEM — Z01.419 WELL WOMAN EXAM WITH ROUTINE GYNECOLOGICAL EXAM: Status: RESOLVED | Noted: 2018-01-22 | Resolved: 2018-11-30

## 2018-11-30 RX ORDER — DOCUSATE SODIUM 100 MG/1
100 CAPSULE ORAL TWICE DAILY
Qty: 60 | Refills: 11 | Status: DISCONTINUED | COMMUNITY
Start: 2018-06-12 | End: 2018-11-30

## 2018-11-30 RX ORDER — METOCLOPRAMIDE 10 MG/1
10 TABLET ORAL EVERY 6 HOURS
Qty: 120 | Refills: 0 | Status: DISCONTINUED | COMMUNITY
Start: 2018-01-22 | End: 2018-11-30

## 2018-11-30 RX ORDER — FERROUS SULFATE 325(65) MG
325 (65 FE) TABLET ORAL TWICE DAILY
Qty: 60 | Refills: 11 | Status: DISCONTINUED | COMMUNITY
Start: 2018-06-12 | End: 2018-11-30

## 2018-12-10 ENCOUNTER — APPOINTMENT (OUTPATIENT)
Dept: OBGYN | Facility: CLINIC | Age: 28
End: 2018-12-10

## 2018-12-21 ENCOUNTER — APPOINTMENT (OUTPATIENT)
Dept: OBGYN | Facility: CLINIC | Age: 28
End: 2018-12-21
Payer: COMMERCIAL

## 2018-12-21 VITALS
DIASTOLIC BLOOD PRESSURE: 64 MMHG | WEIGHT: 221 LBS | BODY MASS INDEX: 40.67 KG/M2 | HEIGHT: 62 IN | SYSTOLIC BLOOD PRESSURE: 102 MMHG

## 2018-12-21 DIAGNOSIS — M54.9 DORSALGIA, UNSPECIFIED: ICD-10-CM

## 2018-12-21 PROCEDURE — 76830 TRANSVAGINAL US NON-OB: CPT

## 2018-12-21 PROCEDURE — 99213 OFFICE O/P EST LOW 20 MIN: CPT | Mod: 25

## 2018-12-22 PROBLEM — M54.9 BACK PAIN: Status: ACTIVE | Noted: 2018-11-30

## 2019-02-21 ENCOUNTER — EMERGENCY (EMERGENCY)
Facility: HOSPITAL | Age: 29
LOS: 1 days | Discharge: HOME | End: 2019-02-21
Attending: EMERGENCY MEDICINE
Payer: COMMERCIAL

## 2019-02-21 VITALS
OXYGEN SATURATION: 98 % | WEIGHT: 225.09 LBS | TEMPERATURE: 98 F | HEART RATE: 89 BPM | HEIGHT: 62 IN | SYSTOLIC BLOOD PRESSURE: 106 MMHG | DIASTOLIC BLOOD PRESSURE: 65 MMHG | RESPIRATION RATE: 17 BRPM

## 2019-02-21 VITALS
OXYGEN SATURATION: 100 % | RESPIRATION RATE: 18 BRPM | HEART RATE: 80 BPM | DIASTOLIC BLOOD PRESSURE: 70 MMHG | SYSTOLIC BLOOD PRESSURE: 102 MMHG | TEMPERATURE: 98 F

## 2019-02-21 LAB
ANION GAP SERPL CALC-SCNC: 12 MMOL/L — SIGNIFICANT CHANGE UP (ref 5–17)
BASOPHILS # BLD AUTO: 0 K/UL — SIGNIFICANT CHANGE UP (ref 0–0.2)
BASOPHILS NFR BLD AUTO: 0.8 % — SIGNIFICANT CHANGE UP (ref 0–2)
BUN SERPL-MCNC: 10 MG/DL — SIGNIFICANT CHANGE UP (ref 7–23)
CALCIUM SERPL-MCNC: 9.3 MG/DL — SIGNIFICANT CHANGE UP (ref 8.4–10.5)
CHLORIDE SERPL-SCNC: 103 MMOL/L — SIGNIFICANT CHANGE UP (ref 96–108)
CO2 SERPL-SCNC: 23 MMOL/L — SIGNIFICANT CHANGE UP (ref 22–31)
CREAT SERPL-MCNC: 0.58 MG/DL — SIGNIFICANT CHANGE UP (ref 0.5–1.3)
EOSINOPHIL # BLD AUTO: 0.1 K/UL — SIGNIFICANT CHANGE UP (ref 0–0.5)
EOSINOPHIL NFR BLD AUTO: 2 % — SIGNIFICANT CHANGE UP (ref 0–6)
GLUCOSE SERPL-MCNC: 95 MG/DL — SIGNIFICANT CHANGE UP (ref 70–99)
HCG UR QL: NEGATIVE — SIGNIFICANT CHANGE UP
HCT VFR BLD CALC: 36.8 % — SIGNIFICANT CHANGE UP (ref 34.5–45)
HGB BLD-MCNC: 12.5 G/DL — SIGNIFICANT CHANGE UP (ref 11.5–15.5)
LYMPHOCYTES # BLD AUTO: 2.1 K/UL — SIGNIFICANT CHANGE UP (ref 1–3.3)
LYMPHOCYTES # BLD AUTO: 35.6 % — SIGNIFICANT CHANGE UP (ref 13–44)
MCHC RBC-ENTMCNC: 31.3 PG — SIGNIFICANT CHANGE UP (ref 27–34)
MCHC RBC-ENTMCNC: 34 GM/DL — SIGNIFICANT CHANGE UP (ref 32–36)
MCV RBC AUTO: 92 FL — SIGNIFICANT CHANGE UP (ref 80–100)
MONOCYTES # BLD AUTO: 0.5 K/UL — SIGNIFICANT CHANGE UP (ref 0–0.9)
MONOCYTES NFR BLD AUTO: 8 % — SIGNIFICANT CHANGE UP (ref 2–14)
NEUTROPHILS # BLD AUTO: 3.1 K/UL — SIGNIFICANT CHANGE UP (ref 1.8–7.4)
NEUTROPHILS NFR BLD AUTO: 53.5 % — SIGNIFICANT CHANGE UP (ref 43–77)
PLATELET # BLD AUTO: 324 K/UL — SIGNIFICANT CHANGE UP (ref 150–400)
POTASSIUM SERPL-MCNC: 4.1 MMOL/L — SIGNIFICANT CHANGE UP (ref 3.5–5.3)
POTASSIUM SERPL-SCNC: 4.1 MMOL/L — SIGNIFICANT CHANGE UP (ref 3.5–5.3)
RBC # BLD: 4 M/UL — SIGNIFICANT CHANGE UP (ref 3.8–5.2)
RBC # FLD: 12.7 % — SIGNIFICANT CHANGE UP (ref 10.3–14.5)
SODIUM SERPL-SCNC: 138 MMOL/L — SIGNIFICANT CHANGE UP (ref 135–145)
WBC # BLD: 5.8 K/UL — SIGNIFICANT CHANGE UP (ref 3.8–10.5)
WBC # FLD AUTO: 5.8 K/UL — SIGNIFICANT CHANGE UP (ref 3.8–10.5)

## 2019-02-21 PROCEDURE — 99284 EMERGENCY DEPT VISIT MOD MDM: CPT | Mod: 25

## 2019-02-21 PROCEDURE — 96361 HYDRATE IV INFUSION ADD-ON: CPT

## 2019-02-21 PROCEDURE — 81025 URINE PREGNANCY TEST: CPT

## 2019-02-21 PROCEDURE — 85027 COMPLETE CBC AUTOMATED: CPT

## 2019-02-21 PROCEDURE — 99284 EMERGENCY DEPT VISIT MOD MDM: CPT

## 2019-02-21 PROCEDURE — 96375 TX/PRO/DX INJ NEW DRUG ADDON: CPT

## 2019-02-21 PROCEDURE — 80048 BASIC METABOLIC PNL TOTAL CA: CPT

## 2019-02-21 PROCEDURE — 96374 THER/PROPH/DIAG INJ IV PUSH: CPT

## 2019-02-21 RX ORDER — ACETAMINOPHEN 500 MG
1000 TABLET ORAL ONCE
Qty: 0 | Refills: 0 | Status: COMPLETED | OUTPATIENT
Start: 2019-02-21 | End: 2019-02-21

## 2019-02-21 RX ORDER — METOCLOPRAMIDE HCL 10 MG
10 TABLET ORAL ONCE
Qty: 0 | Refills: 0 | Status: COMPLETED | OUTPATIENT
Start: 2019-02-21 | End: 2019-02-21

## 2019-02-21 RX ORDER — SODIUM CHLORIDE 9 MG/ML
1000 INJECTION INTRAMUSCULAR; INTRAVENOUS; SUBCUTANEOUS ONCE
Qty: 0 | Refills: 0 | Status: COMPLETED | OUTPATIENT
Start: 2019-02-21 | End: 2019-02-21

## 2019-02-21 RX ADMIN — Medication 400 MILLIGRAM(S): at 10:21

## 2019-02-21 RX ADMIN — Medication 10 MILLIGRAM(S): at 10:21

## 2019-02-21 RX ADMIN — Medication 1000 MILLIGRAM(S): at 10:51

## 2019-02-21 RX ADMIN — SODIUM CHLORIDE 1000 MILLILITER(S): 9 INJECTION INTRAMUSCULAR; INTRAVENOUS; SUBCUTANEOUS at 11:28

## 2019-02-21 RX ADMIN — SODIUM CHLORIDE 2000 MILLILITER(S): 9 INJECTION INTRAMUSCULAR; INTRAVENOUS; SUBCUTANEOUS at 10:22

## 2019-02-21 NOTE — ED PROVIDER NOTE - CONSTITUTIONAL, MLM
normal... Obese, well nourished, awake, alert, oriented to person, place, time/situation and in no apparent distress.

## 2019-02-21 NOTE — ED PROVIDER NOTE - OBJECTIVE STATEMENT
28yof pmhx of GERD Obesity hx of gall stones here for one week of bitemporal pressure like headache. No nausea or vomiting. +photophobia. no hx of migraines. seen by PMD and started on xanax, valium, and Lexapro with no improvement. Took one dose of fiorcet with relief. No gait, speech or vision changes. Worse in the morning, better throughout the day. Not on any OCP.

## 2019-02-21 NOTE — ED PROVIDER NOTE - CARE PROVIDER_API CALL
Jonathan Carreon (MD)  Neurology; Pain Medicine; Psychiatry  [address]  [place] [address]  Phone: [phone]  Fax: [phone]  Follow Up Time:

## 2019-02-21 NOTE — ED PROVIDER NOTE - NSFOLLOWUPINSTRUCTIONS_ED_ALL_ED_FT
Stay well hydrated. Take Tylenol/Motrin as directed as needed for return of headache.  Follow-up with your primary doctor within 1-2 days. Follow-up with neurology- info attached- this week.  Return to an ER for worsening symptoms or any other concerns.

## 2019-02-21 NOTE — ED PROVIDER NOTE - ATTENDING CONTRIBUTION TO CARE
attending Jose: 28yF obese not on OCPs p/w 1 week bitemporal headache. Gradual in onset, radiates to neck. +photophobia. +nausea without vomiting. No personal or family h/o migraine. No trauma. Denies fever, chills, rash. HA worse in the mornings. Seen by PMD for same, attributed symptoms to stress and anxiety, started on xanax, valium and Lexapro without improvement. On exam, NAD, obese, normal gait, no nuchal rigidity, full sensation and motor strength throughout, no pronator drift. Will obtain labs, CTH given new onset headache, symptomatic treatment and reassess

## 2019-02-28 ENCOUNTER — APPOINTMENT (OUTPATIENT)
Dept: SPINE | Facility: CLINIC | Age: 29
End: 2019-02-28

## 2019-09-17 NOTE — ED PROVIDER NOTE - PROGRESS NOTE DETAILS
no attending Jose: HA significantly improved. Awaiting CTH. attending Jose: pt does not wish to wait for CT head in ED. Wants to go home as HA has resolved. Will dc with neuro follow-up. Strict return precautions discussed at length.

## 2021-05-06 ENCOUNTER — TRANSCRIPTION ENCOUNTER (OUTPATIENT)
Age: 31
End: 2021-05-06

## 2021-08-06 ENCOUNTER — TRANSCRIPTION ENCOUNTER (OUTPATIENT)
Age: 31
End: 2021-08-06

## 2021-08-18 DIAGNOSIS — K81.9 CHOLECYSTITIS, UNSPECIFIED: ICD-10-CM

## 2021-08-18 DIAGNOSIS — Z83.49 FAMILY HISTORY OF OTHER ENDOCRINE, NUTRITIONAL AND METABOLIC DISEASES: ICD-10-CM

## 2021-08-18 DIAGNOSIS — K29.80 DUODENITIS W/OUT BLEEDING: ICD-10-CM

## 2021-08-18 DIAGNOSIS — Z84.1 FAMILY HISTORY OF DISORDERS OF KIDNEY AND URETER: ICD-10-CM

## 2021-08-19 ENCOUNTER — LABORATORY RESULT (OUTPATIENT)
Age: 31
End: 2021-08-19

## 2021-08-19 ENCOUNTER — APPOINTMENT (OUTPATIENT)
Dept: INTERNAL MEDICINE | Facility: CLINIC | Age: 31
End: 2021-08-19
Payer: COMMERCIAL

## 2021-08-19 VITALS
BODY MASS INDEX: 43.24 KG/M2 | HEART RATE: 82 BPM | WEIGHT: 235 LBS | RESPIRATION RATE: 16 BRPM | DIASTOLIC BLOOD PRESSURE: 78 MMHG | TEMPERATURE: 96.3 F | OXYGEN SATURATION: 98 % | HEIGHT: 62 IN | SYSTOLIC BLOOD PRESSURE: 114 MMHG

## 2021-08-19 DIAGNOSIS — R79.82 ELEVATED C-REACTIVE PROTEIN (CRP): ICD-10-CM

## 2021-08-19 PROCEDURE — G0447 BEHAVIOR COUNSEL OBESITY 15M: CPT | Mod: NC,59

## 2021-08-19 PROCEDURE — 99395 PREV VISIT EST AGE 18-39: CPT | Mod: 25

## 2021-08-19 PROCEDURE — G0442 ANNUAL ALCOHOL SCREEN 15 MIN: CPT | Mod: NC,59

## 2021-08-19 PROCEDURE — 36415 COLL VENOUS BLD VENIPUNCTURE: CPT

## 2021-08-19 PROCEDURE — G0444 DEPRESSION SCREEN ANNUAL: CPT | Mod: NC,59

## 2021-08-19 RX ORDER — FAMOTIDINE 40 MG/1
40 TABLET, FILM COATED ORAL
Refills: 0 | Status: DISCONTINUED | COMMUNITY
End: 2021-08-19

## 2021-08-19 RX ORDER — CHLORHEXIDINE GLUCONATE 1.2 MG/ML
0.12 RINSE ORAL
Refills: 0 | Status: DISCONTINUED | COMMUNITY
End: 2021-08-19

## 2021-08-19 RX ORDER — IBUPROFEN 600 MG/1
600 TABLET, FILM COATED ORAL 3 TIMES DAILY
Qty: 90 | Refills: 0 | Status: DISCONTINUED | COMMUNITY
Start: 2018-11-28 | End: 2021-08-19

## 2021-08-19 RX ORDER — LACTULOSE 10 G/15ML
10 SOLUTION ORAL DAILY
Qty: 450 | Refills: 1 | Status: DISCONTINUED | COMMUNITY
Start: 2018-11-28 | End: 2021-08-19

## 2021-08-19 RX ORDER — MULTIVIT 47/IRON/FOLATE 1/DHA 27-1-300MG
27-0.6-0.4-3 CAPSULE ORAL
Qty: 30 | Refills: 11 | Status: DISCONTINUED | COMMUNITY
Start: 2018-01-22 | End: 2021-08-19

## 2021-08-19 NOTE — HISTORY OF PRESENT ILLNESS
[FreeTextEntry1] : Annual physical [de-identified] : Migraine headaches patient is reporting having 2–3 a week.  Moderate intermittent often self-limited other times resolution achieved with Fioricet.  Denies dizziness numbness tingling blurry vision.\par GERD intermittent symptoms mostly associated with certain food and coffee.  On and off on Prilosec.\par History of brain cyst.\par Anxiety stable with as needed Xanax.\par

## 2021-08-19 NOTE — ASSESSMENT
[FreeTextEntry1] : Anemia asymptomatic regular flow and frequency..  Check labs.\par Anxiety stable with as needed Xanax.\par GERD diet modifications discussed with the patient avoid caffeinated products avoid tomatoes raw vegetables eating after 6 elevate head of the bed.  Weight loss discussed with the patient.  Prilosec continue.\par Migraine headaches stress reduction discussed with the patient keep headache diary will obtain new MRI given history of brain cyst.  Continue Fioricet as needed.\par Brain cyst will obtain follow-up MRI.\par Elevated CRP work-up is negative nonspecific we will repeat labs.\par Obesity diet and exercise discussed with the patient\par Dscd.D/E wt.loss needs to loose 10% TBW.DSCD.self monitoring, goal setting,problem solving w-b mod.portion control, avoidence of skipping meals, high glycemic foods,snacking and mindless eating in front of the tv.Suggested use of small plates and not keepingall of the food on the dinner table and leaving it at the stove top.Pt was also told to calorie count and an colt was recommended DSCD exercising 20 minutes per day:dscd:med /pharmaco bariatric tx options.\par \par  - Encouraged a low fat/low cholesterol diet\par  - Discussed Healthy eating, avoidance of concentrated sweets, and to include vegetables by at least 3 meals a day\par  - encouraged low glycemic fruits, grains and vegetables and a diet high in plant protein\par  - Discussed regular exercise\par  - Discussed importance of follow up physician visits\par Annual physical routine preventative care and immunizations discussed with the patient healthy lifestyle diet and exercise self breast exam annual Pap.  Blood work checked.  EKG declined by patient.\par Follow-up pending labs\par

## 2021-08-19 NOTE — HEALTH RISK ASSESSMENT
[Yes] : Yes [Monthly or less (1 pt)] : Monthly or less (1 point) [1 or 2 (0 pts)] : 1 or 2 (0 points) [Never (0 pts)] : Never (0 points) [0] : 1) Little interest or pleasure doing things: Not at all (0) [1] : 2) Feeling down, depressed, or hopeless for several days (1) [PHQ-2 Negative - No further assessment needed] : PHQ-2 Negative - No further assessment needed [Audit-CScore] : 1 [[id number]] : 1

## 2021-08-19 NOTE — COUNSELING
[Potential consequences of obesity discussed] : Potential consequences of obesity discussed [Benefits of weight loss discussed] : Benefits of weight loss discussed [Encouraged to maintain food diary] : Encouraged to maintain food diary [Encouraged to increase physical activity] : Encouraged to increase physical activity [FreeTextEntry4] : 10

## 2021-08-21 LAB
25(OH)D3 SERPL-MCNC: 16.8 NG/ML
ALBUMIN SERPL ELPH-MCNC: 4.2 G/DL
ALP BLD-CCNC: 58 U/L
ALT SERPL-CCNC: 36 U/L
ANION GAP SERPL CALC-SCNC: 11 MMOL/L
APPEARANCE: CLEAR
AST SERPL-CCNC: 26 U/L
BASOPHILS # BLD AUTO: 0.03 K/UL
BASOPHILS NFR BLD AUTO: 0.5 %
BILIRUB SERPL-MCNC: 0.2 MG/DL
BILIRUBIN URINE: NEGATIVE
BLOOD URINE: ABNORMAL
BUN SERPL-MCNC: 12 MG/DL
CALCIUM SERPL-MCNC: 9 MG/DL
CHLORIDE SERPL-SCNC: 105 MMOL/L
CHOLEST SERPL-MCNC: 168 MG/DL
CO2 SERPL-SCNC: 24 MMOL/L
COLOR: NORMAL
COVID-19 SPIKE DOMAIN ANTIBODY INTERPRETATION: POSITIVE
CREAT SERPL-MCNC: 0.62 MG/DL
CRP SERPL-MCNC: 14 MG/L
EOSINOPHIL # BLD AUTO: 0.23 K/UL
EOSINOPHIL NFR BLD AUTO: 3.8 %
ESTIMATED AVERAGE GLUCOSE: 108 MG/DL
FOLATE SERPL-MCNC: 9.2 NG/ML
GLUCOSE QUALITATIVE U: NEGATIVE
GLUCOSE SERPL-MCNC: 92 MG/DL
HBA1C MFR BLD HPLC: 5.4 %
HCT VFR BLD CALC: 40.3 %
HDLC SERPL-MCNC: 48 MG/DL
HGB BLD-MCNC: 11.8 G/DL
IMM GRANULOCYTES NFR BLD AUTO: 0.3 %
KETONES URINE: NEGATIVE
LDLC SERPL CALC-MCNC: 98 MG/DL
LEUKOCYTE ESTERASE URINE: NEGATIVE
LYMPHOCYTES # BLD AUTO: 2.12 K/UL
LYMPHOCYTES NFR BLD AUTO: 34.8 %
MAN DIFF?: NORMAL
MCHC RBC-ENTMCNC: 29.3 GM/DL
MCHC RBC-ENTMCNC: 30.1 PG
MCV RBC AUTO: 102.8 FL
MONOCYTES # BLD AUTO: 0.44 K/UL
MONOCYTES NFR BLD AUTO: 7.2 %
NEUTROPHILS # BLD AUTO: 3.25 K/UL
NEUTROPHILS NFR BLD AUTO: 53.4 %
NITRITE URINE: NEGATIVE
NONHDLC SERPL-MCNC: 120 MG/DL
PH URINE: 6
PLATELET # BLD AUTO: 326 K/UL
POTASSIUM SERPL-SCNC: 4.3 MMOL/L
PROT SERPL-MCNC: 6.9 G/DL
PROTEIN URINE: NEGATIVE
RBC # BLD: 3.92 M/UL
RBC # FLD: 14.1 %
SARS-COV-2 AB SERPL IA-ACNC: >250 U/ML
SODIUM SERPL-SCNC: 140 MMOL/L
SPECIFIC GRAVITY URINE: 1.02
TRIGL SERPL-MCNC: 107 MG/DL
TSH SERPL-ACNC: 1.69 UIU/ML
UROBILINOGEN URINE: NORMAL
VIT B12 SERPL-MCNC: 683 PG/ML
WBC # FLD AUTO: 6.09 K/UL

## 2021-09-07 ENCOUNTER — LABORATORY RESULT (OUTPATIENT)
Age: 31
End: 2021-09-07

## 2021-09-07 ENCOUNTER — RESULT REVIEW (OUTPATIENT)
Age: 31
End: 2021-09-07

## 2021-09-07 ENCOUNTER — APPOINTMENT (OUTPATIENT)
Dept: INTERNAL MEDICINE | Facility: CLINIC | Age: 31
End: 2021-09-07
Payer: COMMERCIAL

## 2021-09-07 VITALS
HEART RATE: 89 BPM | TEMPERATURE: 96.8 F | RESPIRATION RATE: 16 BRPM | HEIGHT: 62 IN | WEIGHT: 230 LBS | BODY MASS INDEX: 42.33 KG/M2 | DIASTOLIC BLOOD PRESSURE: 74 MMHG | SYSTOLIC BLOOD PRESSURE: 112 MMHG | OXYGEN SATURATION: 98 %

## 2021-09-07 DIAGNOSIS — K85.90 ACUTE PANCREATITIS WITHOUT NECROSIS OR INFECTION, UNSPECIFIED: ICD-10-CM

## 2021-09-07 DIAGNOSIS — R10.11 RIGHT UPPER QUADRANT PAIN: ICD-10-CM

## 2021-09-07 PROCEDURE — 36415 COLL VENOUS BLD VENIPUNCTURE: CPT

## 2021-09-07 PROCEDURE — 99214 OFFICE O/P EST MOD 30 MIN: CPT | Mod: 25

## 2021-09-09 ENCOUNTER — OUTPATIENT (OUTPATIENT)
Dept: OUTPATIENT SERVICES | Facility: HOSPITAL | Age: 31
LOS: 1 days | End: 2021-09-09
Payer: COMMERCIAL

## 2021-09-09 ENCOUNTER — APPOINTMENT (OUTPATIENT)
Dept: CT IMAGING | Facility: CLINIC | Age: 31
End: 2021-09-09
Payer: COMMERCIAL

## 2021-09-09 DIAGNOSIS — K85.90 ACUTE PANCREATITIS WITHOUT NECROSIS OR INFECTION, UNSPECIFIED: ICD-10-CM

## 2021-09-09 PROBLEM — R10.11 CONTINUOUS RUQ ABDOMINAL PAIN: Status: ACTIVE | Noted: 2021-09-09

## 2021-09-09 LAB
ALBUMIN SERPL ELPH-MCNC: 4.4 G/DL
ALP BLD-CCNC: 57 U/L
ALT SERPL-CCNC: 26 U/L
AMYLASE/CREAT SERPL: 40 U/L
ANION GAP SERPL CALC-SCNC: 11 MMOL/L
APPEARANCE: ABNORMAL
AST SERPL-CCNC: 20 U/L
BASOPHILS # BLD AUTO: 0.03 K/UL
BASOPHILS NFR BLD AUTO: 0.7 %
BILIRUB SERPL-MCNC: 0.2 MG/DL
BILIRUBIN URINE: NEGATIVE
BLOOD URINE: ABNORMAL
BUN SERPL-MCNC: 15 MG/DL
CALCIUM SERPL-MCNC: 8.9 MG/DL
CHLORIDE SERPL-SCNC: 104 MMOL/L
CO2 SERPL-SCNC: 23 MMOL/L
COLOR: YELLOW
CREAT SERPL-MCNC: 0.62 MG/DL
EOSINOPHIL # BLD AUTO: 0.1 K/UL
EOSINOPHIL NFR BLD AUTO: 2.4 %
GLUCOSE QUALITATIVE U: NEGATIVE
GLUCOSE SERPL-MCNC: 92 MG/DL
HCT VFR BLD CALC: 39.9 %
HGB BLD-MCNC: 12.1 G/DL
IMM GRANULOCYTES NFR BLD AUTO: 0.2 %
KETONES URINE: NEGATIVE
LEUKOCYTE ESTERASE URINE: NEGATIVE
LPL SERPL-CCNC: 23 U/L
LYMPHOCYTES # BLD AUTO: 1.79 K/UL
LYMPHOCYTES NFR BLD AUTO: 42.8 %
MAN DIFF?: NORMAL
MCHC RBC-ENTMCNC: 30.3 GM/DL
MCHC RBC-ENTMCNC: 30.5 PG
MCV RBC AUTO: 100.5 FL
MONOCYTES # BLD AUTO: 0.25 K/UL
MONOCYTES NFR BLD AUTO: 6 %
NEUTROPHILS # BLD AUTO: 2 K/UL
NEUTROPHILS NFR BLD AUTO: 47.9 %
NITRITE URINE: NEGATIVE
PH URINE: 5.5
PLATELET # BLD AUTO: 344 K/UL
POTASSIUM SERPL-SCNC: 4.1 MMOL/L
PROT SERPL-MCNC: 7 G/DL
PROTEIN URINE: NORMAL
RBC # BLD: 3.97 M/UL
RBC # FLD: 13.9 %
SODIUM SERPL-SCNC: 138 MMOL/L
SPECIFIC GRAVITY URINE: 1.03
UROBILINOGEN URINE: NORMAL
WBC # FLD AUTO: 4.18 K/UL

## 2021-09-09 PROCEDURE — 74177 CT ABD & PELVIS W/CONTRAST: CPT | Mod: 26

## 2021-09-09 PROCEDURE — 74177 CT ABD & PELVIS W/CONTRAST: CPT

## 2021-09-09 NOTE — HISTORY OF PRESENT ILLNESS
[FreeTextEntry1] : RUQ Pain [de-identified] : 30 year old female with a PMH significant for pancreatitis and prior cholecystectomy presents complaining of RUQ pain for the past 2 weeks.

## 2021-09-09 NOTE — PHYSICAL EXAM
[No Acute Distress] : no acute distress [Well Nourished] : well nourished [Well Developed] : well developed [No Respiratory Distress] : no respiratory distress  [No Accessory Muscle Use] : no accessory muscle use [Clear to Auscultation] : lungs were clear to auscultation bilaterally [Normal Rate] : normal rate  [Regular Rhythm] : with a regular rhythm [Normal S1, S2] : normal S1 and S2 [No Murmur] : no murmur heard [No Abdominal Bruit] : a ~M bruit was not heard ~T in the abdomen [No Edema] : there was no peripheral edema [Soft] : abdomen soft [Non-distended] : non-distended [No Masses] : no abdominal mass palpated [Normal Bowel Sounds] : normal bowel sounds [Normal] : no posterior cervical lymphadenopathy and no anterior cervical lymphadenopathy [de-identified] : Mild RUQ tenderness to palpation

## 2021-09-09 NOTE — REVIEW OF SYSTEMS
[Abdominal Pain] : abdominal pain [Negative] : ENT [Fever] : no fever [Chills] : no chills [Vision Problems] : no vision problems [Chest Pain] : no chest pain [Palpitations] : no palpitations [Shortness Of Breath] : no shortness of breath [Wheezing] : no wheezing [Cough] : no cough [Nausea] : no nausea [Constipation] : no constipation [Diarrhea] : diarrhea [Vomiting] : no vomiting [Dysuria] : no dysuria [Hematuria] : no hematuria [Joint Pain] : no joint pain [Muscle Pain] : no muscle pain [Skin Rash] : no skin rash [Headache] : no headache [Dizziness] : no dizziness [FreeTextEntry7] : No blood in stool

## 2021-09-16 ENCOUNTER — APPOINTMENT (OUTPATIENT)
Dept: INTERNAL MEDICINE | Facility: CLINIC | Age: 31
End: 2021-09-16
Payer: COMMERCIAL

## 2021-09-16 VITALS
DIASTOLIC BLOOD PRESSURE: 72 MMHG | HEIGHT: 62 IN | RESPIRATION RATE: 17 BRPM | HEART RATE: 80 BPM | WEIGHT: 230 LBS | SYSTOLIC BLOOD PRESSURE: 114 MMHG | TEMPERATURE: 97.2 F | OXYGEN SATURATION: 99 % | BODY MASS INDEX: 42.33 KG/M2

## 2021-09-16 DIAGNOSIS — K42.9 UMBILICAL HERNIA W/OUT OBSTRUCTION OR GANGRENE: ICD-10-CM

## 2021-09-16 PROCEDURE — 99214 OFFICE O/P EST MOD 30 MIN: CPT

## 2021-09-16 NOTE — HISTORY OF PRESENT ILLNESS
[FreeTextEntry1] : Follow up  results [de-identified] : Patient presents complaining of right upper quadrant pain for the last few weeks moderate intermittent not associated with food or bowel movements.  Was more intense earlier less frequent now status post CT scan no acute findings fat-containing umbilical hernia was noted.

## 2021-09-16 NOTE — ASSESSMENT
[FreeTextEntry1] : Nominal pain improving CT scan discussed with the patient no acute findings likely gas.  With the patient diet modifications trial PPIs\par Umbilical hernia patient asymptomatic no intervention needed at this time. \par  follow-up 2 weeks if pain persists

## 2021-09-24 DIAGNOSIS — B02.9 ZOSTER W/OUT COMPLICATIONS: ICD-10-CM

## 2021-09-30 ENCOUNTER — APPOINTMENT (OUTPATIENT)
Dept: MRI IMAGING | Facility: CLINIC | Age: 31
End: 2021-09-30

## 2021-11-03 ENCOUNTER — APPOINTMENT (OUTPATIENT)
Dept: INTERNAL MEDICINE | Facility: CLINIC | Age: 31
End: 2021-11-03

## 2021-12-09 ENCOUNTER — APPOINTMENT (OUTPATIENT)
Dept: INTERNAL MEDICINE | Facility: CLINIC | Age: 31
End: 2021-12-09
Payer: COMMERCIAL

## 2021-12-09 VITALS
OXYGEN SATURATION: 99 % | SYSTOLIC BLOOD PRESSURE: 114 MMHG | WEIGHT: 228 LBS | RESPIRATION RATE: 17 BRPM | TEMPERATURE: 97.4 F | DIASTOLIC BLOOD PRESSURE: 74 MMHG | HEART RATE: 79 BPM | BODY MASS INDEX: 41.96 KG/M2 | HEIGHT: 62 IN

## 2021-12-09 DIAGNOSIS — A04.8 OTHER SPECIFIED BACTERIAL INTESTINAL INFECTIONS: ICD-10-CM

## 2021-12-09 PROCEDURE — 99214 OFFICE O/P EST MOD 30 MIN: CPT | Mod: 25

## 2021-12-09 PROCEDURE — 78267 UREA BREATH TST C-14 ACQUISJ: CPT

## 2021-12-09 PROCEDURE — G0447 BEHAVIOR COUNSEL OBESITY 15M: CPT

## 2021-12-09 NOTE — HISTORY OF PRESENT ILLNESS
[FreeTextEntry1] : Abdominal pain [de-identified] : Patient presents complaining of few years history of upper abdominal pain and acid reflux.  Has been diagnosed with H. pylori in the past and treated successfully.  Last endoscopy about 3 years ago showed H. pylori and gastritis with esophagitis.  Pain is burning intermittent frequently after meals.  Associated with painful swallowing sometimes.  Patient denies nausea vomiting changes in bowel movements changes in weight fever.  Recent CT scan that was done for abdominal pain was reviewed and was essentially unremarkable

## 2021-12-09 NOTE — COUNSELING
[Potential consequences of obesity discussed] : Potential consequences of obesity discussed [Benefits of weight loss discussed] : Benefits of weight loss discussed [Encouraged to maintain food diary] : Encouraged to maintain food diary [Encouraged to increase physical activity] : Encouraged to increase physical activity [Encouraged to use exercise tracking device] : Encouraged to use exercise tracking device [FreeTextEntry4] : 7.5

## 2021-12-09 NOTE — ASSESSMENT
[FreeTextEntry1] : Abdominal pain upper/GERD/history of H. pylori–urea breath test done today to rule out H. pylori.  Diet in detail discussed with the patient including but not limited avoidance of any caffeinated products raw fruits and vegetables spicy food.  Avoid late meals elevate head of the bed.  Weight loss is paramount discussed with the patient.  I discussed with the patient that she can retry PPIs together with diet if symptoms will persist will need to get another endoscopy.\par Obesity weight loss discussed with the patient.\par Anemia stable.\par Anxiety controlled continue current regiment.\par Follow-up pending results

## 2021-12-11 LAB — UREA BREATH TEST QL: NEGATIVE

## 2021-12-21 ENCOUNTER — APPOINTMENT (OUTPATIENT)
Dept: INTERNAL MEDICINE | Facility: CLINIC | Age: 31
End: 2021-12-21
Payer: COMMERCIAL

## 2021-12-21 PROCEDURE — 99211 OFF/OP EST MAY X REQ PHY/QHP: CPT

## 2021-12-23 ENCOUNTER — APPOINTMENT (OUTPATIENT)
Dept: INTERNAL MEDICINE | Facility: CLINIC | Age: 31
End: 2021-12-23
Payer: COMMERCIAL

## 2021-12-23 VITALS — TEMPERATURE: 98.3 F

## 2021-12-23 DIAGNOSIS — J06.9 ACUTE UPPER RESPIRATORY INFECTION, UNSPECIFIED: ICD-10-CM

## 2021-12-23 PROCEDURE — 99212 OFFICE O/P EST SF 10 MIN: CPT

## 2021-12-23 NOTE — ASSESSMENT
[FreeTextEntry1] : Covid PCR done pending.\par Upper respiratory infection supportive isolate until PCR results

## 2021-12-23 NOTE — HISTORY OF PRESENT ILLNESS
[FreeTextEntry1] : Covid swab [de-identified] : Patient presents for Covid testing.  Has been exposed to Covid at work.  Patient complaining of upper respiratory symptoms and fatigue for a few days.  Denies fever cough chest pain shortness of breath

## 2021-12-23 NOTE — HISTORY OF PRESENT ILLNESS
[FreeTextEntry1] : Covid swab [de-identified] : Patient presents for Covid testing.  Has been exposed to Covid at work.  Patient complaining of upper respiratory symptoms and fatigue for a few days.  Denies fever cough chest pain shortness of breath

## 2021-12-24 LAB — SARS-COV-2 N GENE NPH QL NAA+PROBE: NOT DETECTED

## 2021-12-29 ENCOUNTER — APPOINTMENT (OUTPATIENT)
Dept: INTERNAL MEDICINE | Facility: CLINIC | Age: 31
End: 2021-12-29
Payer: COMMERCIAL

## 2021-12-29 DIAGNOSIS — Z20.822 CONTACT WITH AND (SUSPECTED) EXPOSURE TO COVID-19: ICD-10-CM

## 2021-12-29 PROCEDURE — 99211 OFF/OP EST MAY X REQ PHY/QHP: CPT

## 2021-12-29 NOTE — REVIEW OF SYSTEMS
[Negative] : ENT [Fever] : no fever [Chills] : no chills [Vision Problems] : no vision problems [Chest Pain] : no chest pain [Palpitations] : no palpitations [Shortness Of Breath] : no shortness of breath [Wheezing] : no wheezing [Cough] : no cough [Abdominal Pain] : no abdominal pain [Nausea] : no nausea [Constipation] : no constipation [Diarrhea] : diarrhea [Vomiting] : no vomiting [Melena] : no melena [Dysuria] : no dysuria [Hematuria] : no hematuria [Joint Pain] : no joint pain [Muscle Pain] : no muscle pain [Skin Rash] : no skin rash [Headache] : no headache [Dizziness] : no dizziness

## 2021-12-29 NOTE — HISTORY OF PRESENT ILLNESS
[FreeTextEntry1] : COVID test  [de-identified] : 32 y/o female presents for COVID testing. She feels otherwise well and offers no other acute complaints or concerns. ROS as documented below.

## 2021-12-29 NOTE — PHYSICAL EXAM
[No Acute Distress] : no acute distress [Well Nourished] : well nourished [Well Developed] : well developed [No Respiratory Distress] : no respiratory distress  [No Accessory Muscle Use] : no accessory muscle use [Clear to Auscultation] : lungs were clear to auscultation bilaterally [Normal Rate] : normal rate  [Regular Rhythm] : with a regular rhythm [Normal S1, S2] : normal S1 and S2 [No Murmur] : no murmur heard [No Abdominal Bruit] : a ~M bruit was not heard ~T in the abdomen [No Edema] : there was no peripheral edema [Soft] : abdomen soft [Non Tender] : non-tender [Non-distended] : non-distended [No Masses] : no abdominal mass palpated [Normal Bowel Sounds] : normal bowel sounds [Normal] : no posterior cervical lymphadenopathy and no anterior cervical lymphadenopathy

## 2021-12-30 LAB
RAPID RVP RESULT: NOT DETECTED
SARS-COV-2 RNA PNL RESP NAA+PROBE: NOT DETECTED

## 2022-01-03 LAB — SARS-COV-2 N GENE NPH QL NAA+PROBE: NOT DETECTED

## 2022-01-24 ENCOUNTER — APPOINTMENT (OUTPATIENT)
Dept: OBGYN | Facility: CLINIC | Age: 32
End: 2022-01-24
Payer: COMMERCIAL

## 2022-01-24 VITALS
WEIGHT: 231 LBS | DIASTOLIC BLOOD PRESSURE: 60 MMHG | BODY MASS INDEX: 42.51 KG/M2 | SYSTOLIC BLOOD PRESSURE: 114 MMHG | HEIGHT: 62 IN

## 2022-01-24 DIAGNOSIS — Z01.419 ENCOUNTER FOR GYNECOLOGICAL EXAMINATION (GENERAL) (ROUTINE) W/OUT ABNORMAL FINDINGS: ICD-10-CM

## 2022-01-24 PROCEDURE — 99395 PREV VISIT EST AGE 18-39: CPT

## 2022-01-24 RX ORDER — VALACYCLOVIR 1 G/1
1 TABLET, FILM COATED ORAL
Qty: 21 | Refills: 0 | Status: DISCONTINUED | COMMUNITY
Start: 2021-09-24 | End: 2022-01-24

## 2022-01-24 RX ORDER — ALPRAZOLAM 0.5 MG/1
0.5 TABLET ORAL
Refills: 0 | Status: DISCONTINUED | COMMUNITY
End: 2022-01-24

## 2022-01-24 RX ORDER — BUTALB/ACETAMINOPHEN/CAFFEINE 50-325-40
50-325-40 TABLET ORAL
Refills: 0 | Status: DISCONTINUED | COMMUNITY
End: 2022-01-24

## 2022-01-24 RX ORDER — FLUCONAZOLE 150 MG/1
150 TABLET ORAL
Qty: 2 | Refills: 3 | Status: DISCONTINUED | COMMUNITY
Start: 2022-01-04 | End: 2022-01-24

## 2022-01-24 RX ORDER — AZITHROMYCIN 250 MG/1
250 TABLET, FILM COATED ORAL
Qty: 6 | Refills: 0 | Status: DISCONTINUED | COMMUNITY
Start: 2021-12-26 | End: 2022-01-24

## 2022-01-24 RX ORDER — OMEPRAZOLE 40 MG/1
40 CAPSULE, DELAYED RELEASE ORAL
Qty: 90 | Refills: 0 | Status: DISCONTINUED | COMMUNITY
End: 2022-01-24

## 2022-01-24 NOTE — REVIEW OF SYSTEMS
[Fatigue] : fatigue [Recent Weight Gain (___ Lbs)] : recent [unfilled] ~Ulb weight gain [Abdominal Pain] : abdominal pain [Heartburn] : heartburn [Incontinence] : incontinence [Pelvic pain] : pelvic pain [Back Pain] : back pain [Anxiety] : anxiety [PMS/PMDD Symptoms] : PMS/PMDD symptoms [History of Anemia] : history of anemia [Negative] : Heme/Lymph

## 2022-01-24 NOTE — PHYSICAL EXAM
[Appropriately responsive] : appropriately responsive [Alert] : alert [No Acute Distress] : no acute distress [Soft] : soft [Non-tender] : non-tender [Oriented x3] : oriented x3 [Examination Of The Breasts] : a normal appearance [No Masses] : no breast masses were palpable [Labia Majora] : normal [Labia Minora] : normal [Normal] : normal [Uterine Adnexae] : non-palpable

## 2022-01-25 LAB
ALBUMIN SERPL ELPH-MCNC: 4.6 G/DL
ALP BLD-CCNC: 64 U/L
ALT SERPL-CCNC: 31 U/L
ANION GAP SERPL CALC-SCNC: 14 MMOL/L
AST SERPL-CCNC: 22 U/L
BASOPHILS # BLD AUTO: 0.02 K/UL
BASOPHILS NFR BLD AUTO: 0.3 %
BILIRUB SERPL-MCNC: 0.2 MG/DL
BUN SERPL-MCNC: 14 MG/DL
C TRACH RRNA SPEC QL NAA+PROBE: NOT DETECTED
CALCIUM SERPL-MCNC: 9.2 MG/DL
CHLORIDE SERPL-SCNC: 106 MMOL/L
CO2 SERPL-SCNC: 20 MMOL/L
CREAT SERPL-MCNC: 0.69 MG/DL
DHEA-S SERPL-MCNC: 53.8 UG/DL
EOSINOPHIL # BLD AUTO: 0.14 K/UL
EOSINOPHIL NFR BLD AUTO: 2.1 %
ESTIMATED AVERAGE GLUCOSE: 114 MG/DL
ESTRADIOL SERPL-MCNC: 131 PG/ML
FSH SERPL-MCNC: 2.9 IU/L
GLUCOSE SERPL-MCNC: 112 MG/DL
HBA1C MFR BLD HPLC: 5.6 %
HBV SURFACE AG SER QL: NONREACTIVE
HCT VFR BLD CALC: 40.3 %
HCV AB SER QL: NONREACTIVE
HCV S/CO RATIO: 0.13 S/CO
HGB BLD-MCNC: 12.6 G/DL
HIV1+2 AB SPEC QL IA.RAPID: NONREACTIVE
HPV HIGH+LOW RISK DNA PNL CVX: NOT DETECTED
IMM GRANULOCYTES NFR BLD AUTO: 0.2 %
INSULIN SERPL-MCNC: 48.7 UU/ML
LH SERPL-ACNC: 6.4 IU/L
LYMPHOCYTES # BLD AUTO: 1.93 K/UL
LYMPHOCYTES NFR BLD AUTO: 29.2 %
MAN DIFF?: NORMAL
MCHC RBC-ENTMCNC: 30.4 PG
MCHC RBC-ENTMCNC: 31.3 GM/DL
MCV RBC AUTO: 97.3 FL
MONOCYTES # BLD AUTO: 0.38 K/UL
MONOCYTES NFR BLD AUTO: 5.8 %
N GONORRHOEA RRNA SPEC QL NAA+PROBE: NOT DETECTED
NEUTROPHILS # BLD AUTO: 4.12 K/UL
NEUTROPHILS NFR BLD AUTO: 62.4 %
PLATELET # BLD AUTO: 342 K/UL
POTASSIUM SERPL-SCNC: 4.2 MMOL/L
PROGEST SERPL-MCNC: 9.2 NG/ML
PROLACTIN SERPL-MCNC: 21.4 NG/ML
PROT SERPL-MCNC: 7.4 G/DL
RBC # BLD: 4.14 M/UL
RBC # FLD: 13.4 %
SODIUM SERPL-SCNC: 140 MMOL/L
SOURCE AMPLIFICATION: NORMAL
SOURCE TP AMPLIFICATION: NORMAL
T PALLIDUM AB SER QL IA: NEGATIVE
T VAGINALIS RRNA SPEC QL NAA+PROBE: NOT DETECTED
T4 FREE SERPL-MCNC: 1.2 NG/DL
TESTOST SERPL-MCNC: 21.4 NG/DL
TSH SERPL-ACNC: 1.7 UIU/ML
WBC # FLD AUTO: 6.6 K/UL

## 2022-01-28 LAB — CYTOLOGY CVX/VAG DOC THIN PREP: NORMAL

## 2022-02-07 ENCOUNTER — APPOINTMENT (OUTPATIENT)
Dept: OBGYN | Facility: CLINIC | Age: 32
End: 2022-02-07
Payer: COMMERCIAL

## 2022-02-07 VITALS
WEIGHT: 231 LBS | BODY MASS INDEX: 42.51 KG/M2 | HEIGHT: 62 IN | SYSTOLIC BLOOD PRESSURE: 124 MMHG | DIASTOLIC BLOOD PRESSURE: 80 MMHG

## 2022-02-07 DIAGNOSIS — Z87.42 PERSONAL HISTORY OF OTHER DISEASES OF THE FEMALE GENITAL TRACT: ICD-10-CM

## 2022-02-07 DIAGNOSIS — E28.2 POLYCYSTIC OVARIAN SYNDROME: ICD-10-CM

## 2022-02-07 DIAGNOSIS — N94.0 MITTELSCHMERZ: ICD-10-CM

## 2022-02-07 DIAGNOSIS — R10.2 PELVIC AND PERINEAL PAIN: ICD-10-CM

## 2022-02-07 PROCEDURE — 76830 TRANSVAGINAL US NON-OB: CPT

## 2022-02-07 PROCEDURE — 99213 OFFICE O/P EST LOW 20 MIN: CPT | Mod: 25

## 2022-02-07 RX ORDER — DEXLANSOPRAZOLE 30 MG/1
30 CAPSULE, DELAYED RELEASE ORAL DAILY
Qty: 90 | Refills: 0 | Status: DISCONTINUED | COMMUNITY
Start: 2021-12-16 | End: 2022-02-07

## 2022-02-07 NOTE — HISTORY OF PRESENT ILLNESS
[FreeTextEntry1] : Pelvic pain.  Dysmenorrhea.  Pain with ovulation.  Irregular menses.  Minimal relief with motrin or tylenol.

## 2022-02-07 NOTE — PLAN
[FreeTextEntry1] : Discussed birth control options to regulate menses.\par OCP, ring, patch, Mirena, Paragard, Nexplanon, barrier, progesterone only including minipill, depoprovera.\par \par Risk reviewed including Blood Clots (DVT and PE), Migraines, High Blood Pressure, Heart Attack, Stroke or Unplanned Pregnancy discussed (failure).\par

## 2022-02-07 NOTE — REVIEW OF SYSTEMS
[Fatigue] : fatigue [Heartburn] : heartburn [Bloating] : bloating [Pelvic pain] : pelvic pain [Back Pain] : back pain [Headache] : headache [Anxiety] : anxiety [PMS/PMDD Symptoms] : PMS/PMDD symptoms [History of Anemia] : history of anemia [Negative] : Heme/Lymph

## 2022-02-16 ENCOUNTER — APPOINTMENT (OUTPATIENT)
Dept: INTERNAL MEDICINE | Facility: CLINIC | Age: 32
End: 2022-02-16
Payer: COMMERCIAL

## 2022-02-16 PROCEDURE — 99211 OFF/OP EST MAY X REQ PHY/QHP: CPT

## 2022-02-17 LAB — SARS-COV-2 N GENE NPH QL NAA+PROBE: NOT DETECTED

## 2022-03-09 ENCOUNTER — LABORATORY RESULT (OUTPATIENT)
Age: 32
End: 2022-03-09

## 2022-03-09 ENCOUNTER — APPOINTMENT (OUTPATIENT)
Dept: INTERNAL MEDICINE | Facility: CLINIC | Age: 32
End: 2022-03-09
Payer: COMMERCIAL

## 2022-03-09 PROCEDURE — 36415 COLL VENOUS BLD VENIPUNCTURE: CPT

## 2022-03-14 LAB
A-LACTALB IGE QN: <0.1 KUA/L
ALBUMIN SERPL ELPH-MCNC: 4.5 G/DL
ALP BLD-CCNC: 64 U/L
ALT SERPL-CCNC: 31 U/L
AMYLASE/CREAT SERPL: 41 U/L
ANION GAP SERPL CALC-SCNC: 12 MMOL/L
AST SERPL-CCNC: 20 U/L
B-LACTOGLOB IGE QN: <0.1 KUA/L
BASOPHILS # BLD AUTO: 0.02 K/UL
BASOPHILS NFR BLD AUTO: 0.3 %
BILIRUB SERPL-MCNC: 0.2 MG/DL
BUN SERPL-MCNC: 10 MG/DL
CALCIUM SERPL-MCNC: 9.1 MG/DL
CASEIN IGE QN: <0.1 KUA/L
CHEDDAR IGE QN: <0.1 KUA/L
CHLORIDE SERPL-SCNC: 104 MMOL/L
CO2 SERPL-SCNC: 23 MMOL/L
COW MILK IGE QN: <0.1 KUA/L
CREAT SERPL-MCNC: 0.62 MG/DL
CRP SERPL-MCNC: 23 MG/L
DEPRECATED A-LACTALB IGE RAST QL: 0
DEPRECATED B-LACTOGLOB IGE RAST QL: 0
DEPRECATED CASEIN IGE RAST QL: 0
DEPRECATED CHEDDAR IGE RAST QL: 0
DEPRECATED CHEDDAR IGE RAST QL: <0.1 KUA/L
DEPRECATED CHEESE MOLD IGE RAST QL: 0
DEPRECATED COW MILK IGE RAST QL: 0
EGFR: 121 ML/MIN/1.73M2
EOSINOPHIL # BLD AUTO: 0.13 K/UL
EOSINOPHIL NFR BLD AUTO: 1.9 %
GLUCOSE SERPL-MCNC: 99 MG/DL
HCT VFR BLD CALC: 40.2 %
HGB BLD-MCNC: 12.6 G/DL
IMM GRANULOCYTES NFR BLD AUTO: 0.4 %
LPL SERPL-CCNC: 25 U/L
LYMPHOCYTES # BLD AUTO: 2.16 K/UL
LYMPHOCYTES NFR BLD AUTO: 32.1 %
MAN DIFF?: NORMAL
MCHC RBC-ENTMCNC: 30.4 PG
MCHC RBC-ENTMCNC: 31.3 GM/DL
MCV RBC AUTO: 97.1 FL
MONOCYTES # BLD AUTO: 0.5 K/UL
MONOCYTES NFR BLD AUTO: 7.4 %
NEUTROPHILS # BLD AUTO: 3.88 K/UL
NEUTROPHILS NFR BLD AUTO: 57.9 %
PLATELET # BLD AUTO: 379 K/UL
POTASSIUM SERPL-SCNC: 4 MMOL/L
PROT SERPL-MCNC: 7.6 G/DL
RBC # BLD: 4.14 M/UL
RBC # FLD: 13.4 %
SODIUM SERPL-SCNC: 140 MMOL/L
WBC # FLD AUTO: 6.72 K/UL

## 2022-03-24 ENCOUNTER — APPOINTMENT (OUTPATIENT)
Dept: INTERNAL MEDICINE | Facility: CLINIC | Age: 32
End: 2022-03-24

## 2022-03-27 LAB — SARS-COV-2 N GENE NPH QL NAA+PROBE: NOT DETECTED

## 2022-04-05 ENCOUNTER — APPOINTMENT (OUTPATIENT)
Dept: CARDIOLOGY | Facility: CLINIC | Age: 32
End: 2022-04-05
Payer: COMMERCIAL

## 2022-04-05 ENCOUNTER — NON-APPOINTMENT (OUTPATIENT)
Age: 32
End: 2022-04-05

## 2022-04-05 VITALS
HEIGHT: 62 IN | WEIGHT: 230 LBS | HEART RATE: 91 BPM | SYSTOLIC BLOOD PRESSURE: 105 MMHG | BODY MASS INDEX: 42.33 KG/M2 | OXYGEN SATURATION: 99 % | DIASTOLIC BLOOD PRESSURE: 69 MMHG | RESPIRATION RATE: 16 BRPM

## 2022-04-05 DIAGNOSIS — Z82.3 FAMILY HISTORY OF STROKE: ICD-10-CM

## 2022-04-05 DIAGNOSIS — Z78.9 OTHER SPECIFIED HEALTH STATUS: ICD-10-CM

## 2022-04-05 DIAGNOSIS — Z82.49 FAMILY HISTORY OF ISCHEMIC HEART DISEASE AND OTHER DISEASES OF THE CIRCULATORY SYSTEM: ICD-10-CM

## 2022-04-05 PROCEDURE — 99204 OFFICE O/P NEW MOD 45 MIN: CPT

## 2022-04-05 PROCEDURE — 93000 ELECTROCARDIOGRAM COMPLETE: CPT | Mod: NC

## 2022-04-05 RX ORDER — MELOXICAM 7.5 MG/1
7.5 TABLET ORAL DAILY
Qty: 30 | Refills: 0 | Status: DISCONTINUED | COMMUNITY
Start: 2022-02-07 | End: 2022-04-05

## 2022-04-05 NOTE — DISCUSSION/SUMMARY
[FreeTextEntry1] : 1.  Echocardiogram and exercise stress test to further stratify prior to surgery given her shortness of breath on exertion.  Echo also to evaluate murmur.\par 2.  If testing is unremarkable there will be no cardiac contraindication to surgery.\par 3.  Monitor through the procedure until stable post procedurally.\par 4.  Patient encouraged to work on diet to lose weight.\par 5.  Patient is encouraged to exercise at least 30 minutes a day everyday of the week.\par 6.  Follow up here in six months.\par

## 2022-04-05 NOTE — PHYSICAL EXAM
[Well Developed] : well developed [Well Nourished] : well nourished [No Acute Distress] : no acute distress [Normal Conjunctiva] : normal conjunctiva [Normal Venous Pressure] : normal venous pressure [No Carotid Bruit] : no carotid bruit [Normal S1, S2] : normal S1, S2 [No Rub] : no rub [No Gallop] : no gallop [Clear Lung Fields] : clear lung fields [Good Air Entry] : good air entry [No Respiratory Distress] : no respiratory distress  [Soft] : abdomen soft [Non Tender] : non-tender [No Masses/organomegaly] : no masses/organomegaly [Normal Bowel Sounds] : normal bowel sounds [Normal Gait] : normal gait [No Edema] : no edema [No Cyanosis] : no cyanosis [No Clubbing] : no clubbing [No Varicosities] : no varicosities [Moves all extremities] : moves all extremities [No Focal Deficits] : no focal deficits [Normal Speech] : normal speech [Alert and Oriented] : alert and oriented [Normal memory] : normal memory [de-identified] : 2/6 systolic murmur at left lower sternal border

## 2022-04-05 NOTE — HISTORY OF PRESENT ILLNESS
[FreeTextEntry1] : Patient presents for evaluation prior to undergoing bariatric surgery.  Patient is planning to do the surgery in the near future.  Patient does report some symptoms of shortness of breath on exertion which has been chronic.  When she does her normal daily activities including her housework she gets some shortness of breath.  This is never really been evaluated but she has blamed this on her weight and deconditioning.  She reports no other physical symptoms at all.  She is able to do her job normally without any real problems.  Patient denies chest pain, palpitations, orthopnea, presyncope, syncope.

## 2022-04-05 NOTE — ASSESSMENT
[FreeTextEntry1] : EKG: Sinus rhythm with no significant ST or T wave changes.\par \par 31-year-old female with a past medical history of morbid obesity who presents to me for evaluation prior to undergoing bariatric surgery.  Patient does report some issues with chronic shortness of breath on exertion even with some white lower levels of exertion.  I have recommended echocardiogram and stress testing prior to undergoing surgery.  She will have this done.  EKG is unremarkable.  If testing is unremarkable there would be no cardiac contraindication to surgery at low cardiovascular risk.  Blood pressure and lipids are well controlled and there is no evidence of diabetes at this time.

## 2022-04-11 ENCOUNTER — APPOINTMENT (OUTPATIENT)
Dept: INTERNAL MEDICINE | Facility: CLINIC | Age: 32
End: 2022-04-11
Payer: COMMERCIAL

## 2022-04-11 PROCEDURE — ZZZZZ: CPT

## 2022-04-12 LAB
CORONAVIRUS (229E,HKU1,NL63,OC43): DETECTED
RAPID RVP RESULT: DETECTED
SARS-COV-2 RNA PNL RESP NAA+PROBE: NOT DETECTED

## 2022-04-18 ENCOUNTER — APPOINTMENT (OUTPATIENT)
Dept: CARDIOLOGY | Facility: CLINIC | Age: 32
End: 2022-04-18
Payer: COMMERCIAL

## 2022-04-18 PROCEDURE — 93306 TTE W/DOPPLER COMPLETE: CPT

## 2022-04-19 ENCOUNTER — APPOINTMENT (OUTPATIENT)
Dept: INTERNAL MEDICINE | Facility: CLINIC | Age: 32
End: 2022-04-19
Payer: COMMERCIAL

## 2022-04-19 PROCEDURE — ZZZZZ: CPT

## 2022-04-24 LAB — SARS-COV-2 N GENE NPH QL NAA+PROBE: NOT DETECTED

## 2022-04-25 ENCOUNTER — APPOINTMENT (OUTPATIENT)
Dept: INTERNAL MEDICINE | Facility: CLINIC | Age: 32
End: 2022-04-25

## 2022-04-26 LAB — SARS-COV-2 N GENE NPH QL NAA+PROBE: NOT DETECTED

## 2022-04-27 ENCOUNTER — APPOINTMENT (OUTPATIENT)
Dept: INTERNAL MEDICINE | Facility: CLINIC | Age: 32
End: 2022-04-27
Payer: COMMERCIAL

## 2022-04-27 VITALS
HEIGHT: 62 IN | TEMPERATURE: 98.8 F | DIASTOLIC BLOOD PRESSURE: 74 MMHG | HEART RATE: 112 BPM | RESPIRATION RATE: 16 BRPM | BODY MASS INDEX: 42.14 KG/M2 | OXYGEN SATURATION: 97 % | SYSTOLIC BLOOD PRESSURE: 110 MMHG | WEIGHT: 229 LBS

## 2022-04-27 DIAGNOSIS — E28.2 POLYCYSTIC OVARIAN SYNDROME: ICD-10-CM

## 2022-04-27 DIAGNOSIS — Z01.811 ENCOUNTER FOR PREPROCEDURAL RESPIRATORY EXAMINATION: ICD-10-CM

## 2022-04-27 DIAGNOSIS — Z78.9 OTHER SPECIFIED HEALTH STATUS: ICD-10-CM

## 2022-04-27 PROCEDURE — 99204 OFFICE O/P NEW MOD 45 MIN: CPT | Mod: 25

## 2022-04-27 PROCEDURE — 94727 GAS DIL/WSHOT DETER LNG VOL: CPT

## 2022-04-27 PROCEDURE — 94729 DIFFUSING CAPACITY: CPT

## 2022-04-27 PROCEDURE — ZZZZZ: CPT

## 2022-04-27 PROCEDURE — 94060 EVALUATION OF WHEEZING: CPT

## 2022-04-27 PROCEDURE — G0447 BEHAVIOR COUNSEL OBESITY 15M: CPT | Mod: 59

## 2022-04-27 RX ORDER — DEXLANSOPRAZOLE 60 MG/1
CAPSULE, DELAYED RELEASE ORAL
Refills: 0 | Status: DISCONTINUED | COMMUNITY
End: 2022-04-27

## 2022-04-29 LAB
M TB IFN-G BLD-IMP: NEGATIVE
QUANTIFERON TB PLUS MITOGEN MINUS NIL: 10 IU/ML
QUANTIFERON TB PLUS NIL: 0 IU/ML
QUANTIFERON TB PLUS TB1 MINUS NIL: 0.02 IU/ML
QUANTIFERON TB PLUS TB2 MINUS NIL: 0.03 IU/ML

## 2022-04-30 ENCOUNTER — APPOINTMENT (OUTPATIENT)
Dept: RADIOLOGY | Facility: CLINIC | Age: 32
End: 2022-04-30
Payer: COMMERCIAL

## 2022-04-30 ENCOUNTER — RESULT REVIEW (OUTPATIENT)
Age: 32
End: 2022-04-30

## 2022-04-30 ENCOUNTER — APPOINTMENT (OUTPATIENT)
Dept: MRI IMAGING | Facility: CLINIC | Age: 32
End: 2022-04-30
Payer: COMMERCIAL

## 2022-04-30 ENCOUNTER — OUTPATIENT (OUTPATIENT)
Dept: OUTPATIENT SERVICES | Facility: HOSPITAL | Age: 32
LOS: 1 days | End: 2022-04-30
Payer: COMMERCIAL

## 2022-04-30 DIAGNOSIS — Z01.818 ENCOUNTER FOR OTHER PREPROCEDURAL EXAMINATION: ICD-10-CM

## 2022-04-30 DIAGNOSIS — G43.709 CHRONIC MIGRAINE WITHOUT AURA, NOT INTRACTABLE, WITHOUT STATUS MIGRAINOSUS: ICD-10-CM

## 2022-04-30 DIAGNOSIS — Z00.8 ENCOUNTER FOR OTHER GENERAL EXAMINATION: ICD-10-CM

## 2022-04-30 DIAGNOSIS — R06.00 DYSPNEA, UNSPECIFIED: ICD-10-CM

## 2022-04-30 PROCEDURE — 71046 X-RAY EXAM CHEST 2 VIEWS: CPT | Mod: 26

## 2022-04-30 PROCEDURE — 71046 X-RAY EXAM CHEST 2 VIEWS: CPT

## 2022-04-30 PROCEDURE — 70551 MRI BRAIN STEM W/O DYE: CPT | Mod: 26

## 2022-04-30 PROCEDURE — 70551 MRI BRAIN STEM W/O DYE: CPT

## 2022-05-10 NOTE — ASSESSMENT
[FreeTextEntry1] : #1  There is no pulmonary contraindication to the patient proceeding to have her planned bariatric surgery, pending my review of her preoperative chest x-ray.  I recommend close monitoring the patient intraoperatively and postoperatively.  I also recommend having oxygen and either CPAP or BiPAP available postoperatively, given the possibility that the patient may have obstructive sleep apnea.\par \par Addendum:  CXR of 4/30/22 is WNL. \par \par Addendum:  Received result of brain MRI ordered by PCP.  This shows an arachnoid cyst.  Discussed with surgeon, Dr Cordero, who said that he will post pone patient's surgery.. \par \par Addendum:  Regarding brain MRI, pt will be seeing neurosurgery on Thursday, Dr Duong.

## 2022-05-10 NOTE — PROCEDURE
[FreeTextEntry1] : Full pulmonary function testing today is within normal limits with an FEV1 of 2.37 or 80% predicted.  Diffusing capacity is normal when corrected for alveolar volume.  Oxygen saturation is 97% on room air.

## 2022-05-10 NOTE — HISTORY OF PRESENT ILLNESS
[TextBox_4] : This is a 31-year-old female with morbid obesity, PCOS, anxiety, who is seen for preoperative pulmonary evaluation before planned bariatric surgery.  Patient does not yet have an operative date.  Surgeon is Dr. Sukhwinder Cordero.  The patient is a non-smoker and has no history of asthma or COPD.  She is not having recent coughing, wheezing, sputum production, or hemoptysis.  No recent fever or chills.  She does become tired and a little breathy going up a flight of stairs.  She thinks that her shortness of breath is due to her weight and deconditioning.  She is not having chest pain.\par \par She has no history of obstructive sleep apnea nor has she had a prior overnight polysomnography sleep study.  She does have occasional snoring.  She denies having excessive daytime somnolence or having to take naps.  She does not awaken with morning headache.  She will occasionally awaken in the mornings not feeling refreshed.\par \par The patient had gallbladder surgery in 2016.  There is no history of problems or complications with this anesthesia.

## 2022-05-10 NOTE — PHYSICAL EXAM
[No Acute Distress] : no acute distress [Normal Oropharynx] : normal oropharynx [Normal Appearance] : normal appearance [No Neck Mass] : no neck mass [Normal Rate/Rhythm] : normal rate/rhythm [Normal S1, S2] : normal s1, s2 [No Resp Distress] : no resp distress [Clear to Auscultation Bilaterally] : clear to auscultation bilaterally [No Abnormalities] : no abnormalities [Benign] : benign [Normal Gait] : normal gait [No Clubbing] : no clubbing [No Cyanosis] : no cyanosis [No Edema] : no edema [FROM] : FROM [Normal Color/ Pigmentation] : normal color/ pigmentation [No Focal Deficits] : no focal deficits [Oriented x3] : oriented x3 [Normal Affect] : normal affect [TextBox_2] : morbid obesity

## 2022-05-12 ENCOUNTER — APPOINTMENT (OUTPATIENT)
Dept: NEUROSURGERY | Facility: CLINIC | Age: 32
End: 2022-05-12
Payer: COMMERCIAL

## 2022-05-12 VITALS
OXYGEN SATURATION: 97 % | HEIGHT: 62 IN | BODY MASS INDEX: 41.04 KG/M2 | WEIGHT: 223 LBS | TEMPERATURE: 98.3 F | SYSTOLIC BLOOD PRESSURE: 103 MMHG | HEART RATE: 90 BPM | DIASTOLIC BLOOD PRESSURE: 71 MMHG

## 2022-05-12 PROCEDURE — 99203 OFFICE O/P NEW LOW 30 MIN: CPT

## 2022-05-12 NOTE — DATA REVIEWED
[de-identified] : EXAM: [id number] - MR BRAIN - ORDERED BY: HEAVENLY MEJÍA\par \par \par PROCEDURE DATE: 04/30/2022\par \par \par \par INTERPRETATION: Clinical Information: Migraines. Arachnoid cyst. Follow-up.\par \par Technique: MRI of the brain was performed without contrast.\par \par Sagittal and axial T1, axial T2, FLAIR, axial T2 GRE, diffusion-weighted images and an ADC map were obtained.\par \par COMPARISON: None.\par \par FINDINGS:\par Arachnoid cyst anterior to the left temporal lobe with mild mass effect on the adjacent cortex measuring 3.9 x 2.2 cm.\par \par The ventricles are within normal limits. There are no areas of abnormal signal within the brain parenchyma. There is no intraparenchymal hematoma or midline shift. There is no diffusion abnormality to suggest acute or subacute infarction. Major flow-voids at the base of the brain follow expected course and contour.\par \par The calvarium is intact. The visualized intraorbital compartments, paranasal sinuses and tympanomastoid cavities appear free of acute disease.\par \par IMPRESSION:\par Arachnoid cyst anterior to the left temporal lobe.\par \par --- End of Report ---\par \par \par \par

## 2022-05-12 NOTE — REASON FOR VISIT
[New Patient Visit] : a new patient visit [Referred By: _________] : Patient was referred by MARIELA [FreeTextEntry1] : Arachnoid cyst anterior to the left temporal lobe.

## 2022-05-12 NOTE — PHYSICAL EXAM
[General Appearance - Alert] : alert [General Appearance - In No Acute Distress] : in no acute distress [Oriented To Time, Place, And Person] : oriented to person, place, and time [Impaired Insight] : insight and judgment were intact [Affect] : the affect was normal [Person] : oriented to person [Place] : oriented to place [Time] : oriented to time [Short Term Intact] : short term memory intact [Remote Intact] : remote memory intact [Span Intact] : the attention span was normal [Concentration Intact] : normal concentrating ability [Fluency] : fluency intact [Comprehension] : comprehension intact [Current Events] : adequate knowledge of current events [Past History] : adequate knowledge of personal past history [Vocabulary] : adequate range of vocabulary [Cranial Nerves Optic (II)] : visual acuity intact bilaterally,  pupils equal round and reactive to light [Cranial Nerves Oculomotor (III)] : extraocular motion intact [Cranial Nerves Facial (VII)] : face symmetrical [Cranial Nerves Trigeminal (V)] : facial sensation intact symmetrically [Cranial Nerves Vestibulocochlear (VIII)] : hearing was intact bilaterally [Cranial Nerves Glossopharyngeal (IX)] : tongue and palate midline [Cranial Nerves Accessory (XI - Cranial And Spinal)] : head turning and shoulder shrug symmetric [Cranial Nerves Hypoglossal (XII)] : there was no tongue deviation with protrusion [Motor Tone] : muscle tone was normal in all four extremities [Motor Strength] : muscle strength was normal in all four extremities [No Muscle Atrophy] : normal bulk in all four extremities [Sensation Tactile Decrease] : light touch was intact [Abnormal Walk] : normal gait [Balance] : balance was intact [2+] : Patella left 2+ [Normal] : normal [Past-pointing] : there was no past-pointing [Tremor] : no tremor present

## 2022-05-12 NOTE — ASSESSMENT
[FreeTextEntry1] : Ms. Hayes presents with above history and imaging.   Patient is neurologically intact.  I have reviewed her MRI brain which reveals Arachnoid cyst anterior to the left temporal lobe stable from 2019. \par She will obtain an MRI brain w/wo contrast to assess for any evidence of enhancement.\par Patient has been given an opportunity to ask and have their questions answered to their satisfaction.\par Patient knows to call the office if there are any new or worsening symptoms.\par \par \par \par I, Dr. Ap Duong, personally performed the evaluation and management (E/M) services for this patient.  That E/M includes assessment of the initial examination, assessing the pertinent medical and surgical history, and establishing the plan of care.  At the time of the visit, my ACP, Tasneem Shankar, actively participated in the evaluation and management services for this patient to be followed going forward in accordance with the plan documented in the clinical note.\par \par \par

## 2022-05-12 NOTE — HISTORY OF PRESENT ILLNESS
[FreeTextEntry1] : headaches  [de-identified] : SAMI SOLER is a 31 year old female presents for initial neurosurgical evaluation of arachnoid cyst.   Past medical history includes migraines.   Patient diagnosed in 2019 with arachnoid cyst. No interval follow up.  Patient is scheduled for bariatric surgery with Dr. Sukhwinder Cordero 6/15/2022.  In preparation for her workup, she obtained an MRI brain which reveals evidence of a arachnoid cyst.   \par She admits to intermittent headaches that are stable.  No n/v or vision changes. NO seizures. \par Non smoker.\par \par

## 2022-05-18 ENCOUNTER — APPOINTMENT (OUTPATIENT)
Dept: CARDIOLOGY | Facility: CLINIC | Age: 32
End: 2022-05-18
Payer: COMMERCIAL

## 2022-05-18 ENCOUNTER — APPOINTMENT (OUTPATIENT)
Dept: MRI IMAGING | Facility: CLINIC | Age: 32
End: 2022-05-18
Payer: COMMERCIAL

## 2022-05-18 ENCOUNTER — OUTPATIENT (OUTPATIENT)
Dept: OUTPATIENT SERVICES | Facility: HOSPITAL | Age: 32
LOS: 1 days | End: 2022-05-18
Payer: COMMERCIAL

## 2022-05-18 DIAGNOSIS — Z00.8 ENCOUNTER FOR OTHER GENERAL EXAMINATION: ICD-10-CM

## 2022-05-18 DIAGNOSIS — G93.0 CEREBRAL CYSTS: ICD-10-CM

## 2022-05-18 PROCEDURE — 93015 CV STRESS TEST SUPVJ I&R: CPT

## 2022-05-18 PROCEDURE — 70553 MRI BRAIN STEM W/O & W/DYE: CPT

## 2022-05-18 PROCEDURE — A9585: CPT

## 2022-05-18 PROCEDURE — 70553 MRI BRAIN STEM W/O & W/DYE: CPT | Mod: 26

## 2022-05-24 ENCOUNTER — APPOINTMENT (OUTPATIENT)
Dept: CARDIOLOGY | Facility: CLINIC | Age: 32
End: 2022-05-24
Payer: COMMERCIAL

## 2022-05-24 VITALS
HEART RATE: 86 BPM | WEIGHT: 217 LBS | BODY MASS INDEX: 39.93 KG/M2 | HEIGHT: 62 IN | RESPIRATION RATE: 16 BRPM | OXYGEN SATURATION: 98 % | DIASTOLIC BLOOD PRESSURE: 79 MMHG | SYSTOLIC BLOOD PRESSURE: 124 MMHG

## 2022-05-24 DIAGNOSIS — E66.9 OBESITY, UNSPECIFIED: ICD-10-CM

## 2022-05-24 PROCEDURE — 99213 OFFICE O/P EST LOW 20 MIN: CPT

## 2022-05-24 RX ORDER — ALPRAZOLAM 0.5 MG/1
0.5 TABLET ORAL 3 TIMES DAILY
Qty: 30 | Refills: 0 | Status: DISCONTINUED | COMMUNITY
Start: 2021-09-02 | End: 2022-05-24

## 2022-05-24 NOTE — DISCUSSION/SUMMARY
[FreeTextEntry1] : 1.  Proceed with surgery as planned.\par 2.  Monitor through the procedure until stable post procedurally.\par 3.  No additional cardiac testing at this time.\par 4.  Patient encouraged to work on diet to lose weight.\par 5.  Patient is encouraged to exercise at least 30 minutes a day everyday of the week.\par 6.  Follow up here in 4 months.\par

## 2022-05-24 NOTE — ASSESSMENT
[FreeTextEntry1] : Echocardiogram April 18, 2022 demonstrates left ventricle normal size and function with ejection fraction of 55 to 60%.  Trace mitral, mild aortic and mild pulmonic regurgitation noted.\par \par Exercise stress test May 18, 2022 during which the patient exercised via a Allen protocol for 4 minutes and 55 seconds, totaling 6 METS.  Peak heart rate achieved was 179 bpm, representing 96% of age-predicted maximum.  Blood pressure response to exercise was normal.  EKG showed no evidence of ischemia with exercise.\par \par 31-year-old female with a past medical history of morbid obesity who presented to me for evaluation prior to undergoing bariatric surgery.  Cardiac testing includes an echocardiogram showing a normal EF and no significant valvular or structural abnormality.  Stress testing shows no evidence of ischemia although her exercise capacity is profoundly reduced.  At this time there is no cardiac contraindication to surgery but I certain encouraged her strongly that she needs to begin exercising and improving her physical activity in addition to the weight loss.

## 2022-05-24 NOTE — PHYSICAL EXAM
[Well Developed] : well developed [Well Nourished] : well nourished [No Acute Distress] : no acute distress [Normal Conjunctiva] : normal conjunctiva [Normal Venous Pressure] : normal venous pressure [No Carotid Bruit] : no carotid bruit [Normal S1, S2] : normal S1, S2 [No Rub] : no rub [No Gallop] : no gallop [Clear Lung Fields] : clear lung fields [Good Air Entry] : good air entry [No Respiratory Distress] : no respiratory distress  [Soft] : abdomen soft [Non Tender] : non-tender [No Masses/organomegaly] : no masses/organomegaly [Normal Bowel Sounds] : normal bowel sounds [Normal Gait] : normal gait [No Edema] : no edema [No Cyanosis] : no cyanosis [No Clubbing] : no clubbing [No Varicosities] : no varicosities [Moves all extremities] : moves all extremities [No Focal Deficits] : no focal deficits [Normal Speech] : normal speech [Alert and Oriented] : alert and oriented [Normal memory] : normal memory [de-identified] : 2/6 systolic murmur at left lower sternal border

## 2022-05-24 NOTE — HISTORY OF PRESENT ILLNESS
[FreeTextEntry1] : Patient presents back today feeling well.  She continues to admit to not doing very much exercise but is trying to do a little bit.  The shortness of breath she has when she exerts herself to higher level has not changed.  She reports no other new symptoms.  Patient denies chest pain, shortness of breath, palpitations, orthopnea, presyncope, syncope.

## 2022-06-02 ENCOUNTER — NON-APPOINTMENT (OUTPATIENT)
Age: 32
End: 2022-06-02

## 2022-06-08 ENCOUNTER — OUTPATIENT (OUTPATIENT)
Dept: OUTPATIENT SERVICES | Facility: HOSPITAL | Age: 32
LOS: 1 days | End: 2022-06-08
Payer: COMMERCIAL

## 2022-06-08 VITALS
HEART RATE: 82 BPM | WEIGHT: 216.05 LBS | HEIGHT: 62 IN | DIASTOLIC BLOOD PRESSURE: 57 MMHG | RESPIRATION RATE: 16 BRPM | OXYGEN SATURATION: 100 % | SYSTOLIC BLOOD PRESSURE: 110 MMHG | TEMPERATURE: 98 F

## 2022-06-08 DIAGNOSIS — E66.01 MORBID (SEVERE) OBESITY DUE TO EXCESS CALORIES: ICD-10-CM

## 2022-06-08 DIAGNOSIS — Z01.818 ENCOUNTER FOR OTHER PREPROCEDURAL EXAMINATION: ICD-10-CM

## 2022-06-08 DIAGNOSIS — Z29.9 ENCOUNTER FOR PROPHYLACTIC MEASURES, UNSPECIFIED: ICD-10-CM

## 2022-06-08 DIAGNOSIS — Z90.49 ACQUIRED ABSENCE OF OTHER SPECIFIED PARTS OF DIGESTIVE TRACT: Chronic | ICD-10-CM

## 2022-06-08 LAB
ALBUMIN SERPL ELPH-MCNC: 3.8 G/DL — SIGNIFICANT CHANGE UP (ref 3.3–5)
ANION GAP SERPL CALC-SCNC: 5 MMOL/L — SIGNIFICANT CHANGE UP (ref 5–17)
APPEARANCE UR: CLEAR — SIGNIFICANT CHANGE UP
APTT BLD: 35.9 SEC — HIGH (ref 27.5–35.5)
BASOPHILS # BLD AUTO: 0.03 K/UL — SIGNIFICANT CHANGE UP (ref 0–0.2)
BASOPHILS NFR BLD AUTO: 0.5 % — SIGNIFICANT CHANGE UP (ref 0–2)
BILIRUB UR-MCNC: NEGATIVE — SIGNIFICANT CHANGE UP
BLOOD GAS SOURCE: SIGNIFICANT CHANGE UP
BUN SERPL-MCNC: 13 MG/DL — SIGNIFICANT CHANGE UP (ref 7–23)
CALCIUM SERPL-MCNC: 9.5 MG/DL — SIGNIFICANT CHANGE UP (ref 8.5–10.1)
CHLORIDE SERPL-SCNC: 108 MMOL/L — SIGNIFICANT CHANGE UP (ref 96–108)
CO2 SERPL-SCNC: 24 MMOL/L — SIGNIFICANT CHANGE UP (ref 22–31)
COHGB MFR BLDV: 2.1 % — SIGNIFICANT CHANGE UP
COLOR SPEC: YELLOW — SIGNIFICANT CHANGE UP
CREAT SERPL-MCNC: 0.68 MG/DL — SIGNIFICANT CHANGE UP (ref 0.5–1.3)
DIFF PNL FLD: ABNORMAL
EGFR: 118 ML/MIN/1.73M2 — SIGNIFICANT CHANGE UP
EOSINOPHIL # BLD AUTO: 0.07 K/UL — SIGNIFICANT CHANGE UP (ref 0–0.5)
EOSINOPHIL NFR BLD AUTO: 1.1 % — SIGNIFICANT CHANGE UP (ref 0–6)
GLUCOSE SERPL-MCNC: 86 MG/DL — SIGNIFICANT CHANGE UP (ref 70–99)
GLUCOSE UR QL: NEGATIVE — SIGNIFICANT CHANGE UP
HCT VFR BLD CALC: 39.2 % — SIGNIFICANT CHANGE UP (ref 34.5–45)
HGB BLD-MCNC: 13 G/DL — SIGNIFICANT CHANGE UP (ref 11.5–15.5)
IMM GRANULOCYTES NFR BLD AUTO: 0.3 % — SIGNIFICANT CHANGE UP (ref 0–1.5)
INR BLD: 1.2 RATIO — HIGH (ref 0.88–1.16)
KETONES UR-MCNC: ABNORMAL
LEUKOCYTE ESTERASE UR-ACNC: ABNORMAL
LYMPHOCYTES # BLD AUTO: 1.73 K/UL — SIGNIFICANT CHANGE UP (ref 1–3.3)
LYMPHOCYTES # BLD AUTO: 27.4 % — SIGNIFICANT CHANGE UP (ref 13–44)
MCHC RBC-ENTMCNC: 31.6 PG — SIGNIFICANT CHANGE UP (ref 27–34)
MCHC RBC-ENTMCNC: 33.2 GM/DL — SIGNIFICANT CHANGE UP (ref 32–36)
MCV RBC AUTO: 95.1 FL — SIGNIFICANT CHANGE UP (ref 80–100)
MONOCYTES # BLD AUTO: 0.51 K/UL — SIGNIFICANT CHANGE UP (ref 0–0.9)
MONOCYTES NFR BLD AUTO: 8.1 % — SIGNIFICANT CHANGE UP (ref 2–14)
NEUTROPHILS # BLD AUTO: 3.95 K/UL — SIGNIFICANT CHANGE UP (ref 1.8–7.4)
NEUTROPHILS NFR BLD AUTO: 62.6 % — SIGNIFICANT CHANGE UP (ref 43–77)
NITRITE UR-MCNC: NEGATIVE — SIGNIFICANT CHANGE UP
PH UR: 6 — SIGNIFICANT CHANGE UP (ref 5–8)
PLATELET # BLD AUTO: 315 K/UL — SIGNIFICANT CHANGE UP (ref 150–400)
POTASSIUM SERPL-MCNC: 3.9 MMOL/L — SIGNIFICANT CHANGE UP (ref 3.5–5.3)
POTASSIUM SERPL-SCNC: 3.9 MMOL/L — SIGNIFICANT CHANGE UP (ref 3.5–5.3)
PROT UR-MCNC: NEGATIVE — SIGNIFICANT CHANGE UP
PROTHROM AB SERPL-ACNC: 14 SEC — HIGH (ref 10.5–13.4)
RBC # BLD: 4.12 M/UL — SIGNIFICANT CHANGE UP (ref 3.8–5.2)
RBC # FLD: 13.4 % — SIGNIFICANT CHANGE UP (ref 10.3–14.5)
SODIUM SERPL-SCNC: 137 MMOL/L — SIGNIFICANT CHANGE UP (ref 135–145)
SP GR SPEC: 1.02 — SIGNIFICANT CHANGE UP (ref 1.01–1.02)
UROBILINOGEN FLD QL: NEGATIVE — SIGNIFICANT CHANGE UP
WBC # BLD: 6.31 K/UL — SIGNIFICANT CHANGE UP (ref 3.8–10.5)
WBC # FLD AUTO: 6.31 K/UL — SIGNIFICANT CHANGE UP (ref 3.8–10.5)

## 2022-06-08 PROCEDURE — G0463: CPT | Mod: 25

## 2022-06-08 PROCEDURE — 86850 RBC ANTIBODY SCREEN: CPT

## 2022-06-08 PROCEDURE — 85025 COMPLETE CBC W/AUTO DIFF WBC: CPT

## 2022-06-08 PROCEDURE — 71046 X-RAY EXAM CHEST 2 VIEWS: CPT

## 2022-06-08 PROCEDURE — 82040 ASSAY OF SERUM ALBUMIN: CPT

## 2022-06-08 PROCEDURE — 81001 URINALYSIS AUTO W/SCOPE: CPT

## 2022-06-08 PROCEDURE — 85610 PROTHROMBIN TIME: CPT

## 2022-06-08 PROCEDURE — 93005 ELECTROCARDIOGRAM TRACING: CPT

## 2022-06-08 PROCEDURE — 80048 BASIC METABOLIC PNL TOTAL CA: CPT

## 2022-06-08 PROCEDURE — 36415 COLL VENOUS BLD VENIPUNCTURE: CPT

## 2022-06-08 PROCEDURE — 71046 X-RAY EXAM CHEST 2 VIEWS: CPT | Mod: 26

## 2022-06-08 PROCEDURE — 86900 BLOOD TYPING SEROLOGIC ABO: CPT

## 2022-06-08 PROCEDURE — 86901 BLOOD TYPING SEROLOGIC RH(D): CPT

## 2022-06-08 PROCEDURE — 82375 ASSAY CARBOXYHB QUANT: CPT

## 2022-06-08 PROCEDURE — 93010 ELECTROCARDIOGRAM REPORT: CPT

## 2022-06-08 PROCEDURE — 85730 THROMBOPLASTIN TIME PARTIAL: CPT

## 2022-06-08 NOTE — H&P PST ADULT - NSICDXPASTMEDICALHX_GEN_ALL_CORE_FT
PAST MEDICAL HISTORY:  Anxiety     Arachnoid cyst stable    COVID-19 virus infection 2020--recovered at home    Elevated liver enzymes     Gall stones     GERD (gastroesophageal reflux disease)     Hiatal hernia     IBS (irritable bowel syndrome) Has been under control since limiting dairy products    Migraines     Morbid obesity     PCOS (polycystic ovarian syndrome)

## 2022-06-08 NOTE — H&P PST ADULT - NEUROLOGICAL COMMENTS
Arachnoid cyst, Migraine headaches followed by [facility] neurology, stable, has seen for optimization for surgery

## 2022-06-08 NOTE — H&P PST ADULT - OPHTHALMOLOGIC
Pt here with right hand splinter. Was seen in [facility] and was given tetanus shot but was unable to get it removed. details…

## 2022-06-08 NOTE — H&P PST ADULT - GASTROINTESTINAL DETAILS
Is This A New Presentation, Or A Follow-Up?: Skin Lesion
How Severe Is Your Skin Lesion?: mild
Has Your Skin Lesion Been Treated?: not been treated
Additional History: Pt states that she does not pay attention to the lesion to notice it changing.
soft/nontender/no distention

## 2022-06-08 NOTE — H&P PST ADULT - ASSESSMENT
31 y.o female scheduled for  Laparoscopic Gastric Bypass, Intraoperative Endoscopy   Plan  1. Stop all NSAIDS, herbal supplements and vitamins for 7 days.  2. NPO at midnight.  3. Take the following medications Xanax  with small sips of water on the morning of your procedure/surgery.  4. Use EZ sponges as directed  5. Labs, EKG, CXR  as per surgeon  6. PMD ALE[facility] & [facility] visit for optimization prior to surgery as per surgeon  7. UCG STAT on admit  8. COVID swab appt: 2022    CAPRINI SCORE    AGE RELATED RISK FACTORS                                                       MOBILITY RELATED FACTORS  [ ] Age 41-60 years                                            (1 Point)                  [ ] Bed rest                                                        (1 Point)  [ ] Age: 61-74 years                                           (2 Points)                [ ] Plaster cast                                                   (2 Points)  [ ] Age= 75 years                                              (3 Points)                 [ ] Bed bound for more than 72 hours                   (2 Points)    DISEASE RELATED RISK FACTORS                                               GENDER SPECIFIC FACTORS  [ ] Edema in the lower extremities                       (1 Point)                  [ ] Pregnancy                                                     (1 Point)  [ ] Varicose veins                                               (1 Point)                  [ ] Post-partum < 6 weeks                                   (1 Point)             [x ] BMI > 25 Kg/m2                                            (1 Point)                  [ ] Hormonal therapy  or oral contraception            (1 Point)                 [ ] Sepsis (in the previous month)                        (1 Point)                  [ ] History of pregnancy complications  [ ] Pneumonia or serious lung disease                                               [ ] Unexplained or recurrent                       (1 Point)           (in the previous month)                               (1 Point)  [ ] Abnormal pulmonary function test                     (1 Point)                 SURGERY RELATED RISK FACTORS  [ ] Acute myocardial infarction                              (1 Point)                 [ ]  Section                                            (1 Point)  [ ] Congestive heart failure (in the previous month)  (1 Point)                 [ ] Minor surgery                                                 (1 Point)   [ ] Inflammatory bowel disease                             (1 Point)                 [ ] Arthroscopic surgery                                        (2 Points)  [ ] Central venous access                                    (2 Points)                [x ] General surgery lasting more than 45 minutes   (2 Points)       [ ] Stroke (in the previous month)                          (5 Points)               [ ] Elective arthroplasty                                        (5 Points)                                                                                                                                               HEMATOLOGY RELATED FACTORS                                                 TRAUMA RELATED RISK FACTORS  [ ] Prior episodes of VTE                                     (3 Points)                 [ ] Fracture of the hip, pelvis, or leg                       (5 Points)  [ ] Positive family history for VTE                         (3 Points)                 [ ] Acute spinal cord injury (in the previous month)  (5 Points)  [ ] Prothrombin 71834 A                                      (3 Points)                 [ ] Paralysis  (less than 1 month)                          (5 Points)  [ ] Factor V Leiden                                             (3 Points)                 [ ] Multiple Trauma within 1 month                         (5 Points)  [ ] Lupus anticoagulants                                     (3 Points)                                                           [ ] Anticardiolipin antibodies                                (3 Points)                                                       [ ] High homocysteine in the blood                      (3 Points)                                             [ ] Other congenital or acquired thrombophilia       (3 Points)                                                [ ] Heparin induced thrombocytopenia                  (3 Points)                                          Total Score [  3        ]    The Caprini score indicates this patient is at risk for a VTE event (score 3-5).  Most surgical patients in this group would benefit from pharmacologic prophylaxis.  The surgical team will determine the balance between VTE risk and bleeding risk

## 2022-06-08 NOTE — H&P PST ADULT - HISTORY OF PRESENT ILLNESS
31 y.o WD, W[place] female presents to PST for upcoming gastric bypass. Patient reports years of struggles with her weight. She has tried multiple weight loss modalities without success. Patient has followed with bariatric surgeon and opting for surgical intervention. Scheduled for Laparoscopic Gastric Bypass, Intraoperative Endoscopy

## 2022-06-09 ENCOUNTER — LABORATORY RESULT (OUTPATIENT)
Age: 32
End: 2022-06-09

## 2022-06-09 ENCOUNTER — APPOINTMENT (OUTPATIENT)
Dept: INTERNAL MEDICINE | Facility: CLINIC | Age: 32
End: 2022-06-09
Payer: COMMERCIAL

## 2022-06-09 VITALS
SYSTOLIC BLOOD PRESSURE: 112 MMHG | TEMPERATURE: 98.9 F | HEIGHT: 62 IN | HEART RATE: 93 BPM | BODY MASS INDEX: 38.64 KG/M2 | RESPIRATION RATE: 18 BRPM | WEIGHT: 210 LBS | DIASTOLIC BLOOD PRESSURE: 78 MMHG | OXYGEN SATURATION: 97 %

## 2022-06-09 DIAGNOSIS — Z01.818 ENCOUNTER FOR OTHER PREPROCEDURAL EXAMINATION: ICD-10-CM

## 2022-06-09 DIAGNOSIS — R79.1 ABNORMAL COAGULATION PROFILE: ICD-10-CM

## 2022-06-09 DIAGNOSIS — E66.01 MORBID (SEVERE) OBESITY DUE TO EXCESS CALORIES: ICD-10-CM

## 2022-06-09 DIAGNOSIS — G93.0 CEREBRAL CYSTS: ICD-10-CM

## 2022-06-09 DIAGNOSIS — R82.90 UNSPECIFIED ABNORMAL FINDINGS IN URINE: ICD-10-CM

## 2022-06-09 PROCEDURE — 36415 COLL VENOUS BLD VENIPUNCTURE: CPT

## 2022-06-09 PROCEDURE — 99214 OFFICE O/P EST MOD 30 MIN: CPT | Mod: 25

## 2022-06-11 LAB
APPEARANCE: CLEAR
APTT BLD: 34.2 SEC
BILIRUBIN URINE: NEGATIVE
BLOOD URINE: ABNORMAL
COLOR: COLORLESS
GLUCOSE QUALITATIVE U: NEGATIVE
HCG SERPL-MCNC: <1 MIU/ML
INR PPP: 1.15 RATIO
KETONES URINE: ABNORMAL
LEUKOCYTE ESTERASE URINE: NEGATIVE
NITRITE URINE: NEGATIVE
PH URINE: 6.5
PROTEIN URINE: NEGATIVE
PT BLD: 13.4 SEC
SPECIFIC GRAVITY URINE: 1
UROBILINOGEN URINE: NORMAL

## 2022-06-12 DIAGNOSIS — Z11.52 ENCOUNTER FOR SCREENING FOR COVID-19: ICD-10-CM

## 2022-06-14 LAB — SARS-COV-2 N GENE NPH QL NAA+PROBE: NOT DETECTED

## 2022-06-14 RX ORDER — ONDANSETRON 8 MG/1
4 TABLET, FILM COATED ORAL ONCE
Refills: 0 | Status: COMPLETED | OUTPATIENT
Start: 2022-06-15 | End: 2022-06-15

## 2022-06-14 RX ORDER — SODIUM CHLORIDE 9 MG/ML
1000 INJECTION, SOLUTION INTRAVENOUS
Refills: 0 | Status: DISCONTINUED | OUTPATIENT
Start: 2022-06-15 | End: 2022-06-15

## 2022-06-15 ENCOUNTER — INPATIENT (INPATIENT)
Facility: HOSPITAL | Age: 32
LOS: 0 days | Discharge: HOME | DRG: 621 | End: 2022-06-16
Attending: SURGERY | Admitting: SURGERY
Payer: COMMERCIAL

## 2022-06-15 VITALS
TEMPERATURE: 98 F | HEIGHT: 62 IN | DIASTOLIC BLOOD PRESSURE: 68 MMHG | OXYGEN SATURATION: 99 % | HEART RATE: 91 BPM | WEIGHT: 207.9 LBS | RESPIRATION RATE: 15 BRPM | SYSTOLIC BLOOD PRESSURE: 100 MMHG

## 2022-06-15 DIAGNOSIS — E66.01 MORBID (SEVERE) OBESITY DUE TO EXCESS CALORIES: ICD-10-CM

## 2022-06-15 DIAGNOSIS — Z90.49 ACQUIRED ABSENCE OF OTHER SPECIFIED PARTS OF DIGESTIVE TRACT: Chronic | ICD-10-CM

## 2022-06-15 LAB — HCG UR QL: NEGATIVE — SIGNIFICANT CHANGE UP

## 2022-06-15 PROCEDURE — 80048 BASIC METABOLIC PNL TOTAL CA: CPT

## 2022-06-15 PROCEDURE — 82962 GLUCOSE BLOOD TEST: CPT

## 2022-06-15 PROCEDURE — C1889: CPT

## 2022-06-15 PROCEDURE — 36415 COLL VENOUS BLD VENIPUNCTURE: CPT

## 2022-06-15 PROCEDURE — C9113: CPT

## 2022-06-15 PROCEDURE — 81025 URINE PREGNANCY TEST: CPT

## 2022-06-15 PROCEDURE — 85025 COMPLETE CBC W/AUTO DIFF WBC: CPT

## 2022-06-15 RX ORDER — HYDROMORPHONE HYDROCHLORIDE 2 MG/ML
0.5 INJECTION INTRAMUSCULAR; INTRAVENOUS; SUBCUTANEOUS
Refills: 0 | Status: DISCONTINUED | OUTPATIENT
Start: 2022-06-15 | End: 2022-06-15

## 2022-06-15 RX ORDER — ENOXAPARIN SODIUM 100 MG/ML
40 INJECTION SUBCUTANEOUS EVERY 12 HOURS
Refills: 0 | Status: DISCONTINUED | OUTPATIENT
Start: 2022-06-15 | End: 2022-06-16

## 2022-06-15 RX ORDER — SODIUM CHLORIDE 9 MG/ML
1000 INJECTION, SOLUTION INTRAVENOUS
Refills: 0 | Status: DISCONTINUED | OUTPATIENT
Start: 2022-06-15 | End: 2022-06-16

## 2022-06-15 RX ORDER — OXYCODONE HYDROCHLORIDE 5 MG/1
5 TABLET ORAL EVERY 4 HOURS
Refills: 0 | Status: DISCONTINUED | OUTPATIENT
Start: 2022-06-15 | End: 2022-06-16

## 2022-06-15 RX ORDER — APREPITANT 80 MG/1
80 CAPSULE ORAL ONCE
Refills: 0 | Status: COMPLETED | OUTPATIENT
Start: 2022-06-15 | End: 2022-06-15

## 2022-06-15 RX ORDER — PANTOPRAZOLE SODIUM 20 MG/1
40 TABLET, DELAYED RELEASE ORAL EVERY 24 HOURS
Refills: 0 | Status: DISCONTINUED | OUTPATIENT
Start: 2022-06-15 | End: 2022-06-16

## 2022-06-15 RX ORDER — KETOROLAC TROMETHAMINE 30 MG/ML
30 SYRINGE (ML) INJECTION EVERY 6 HOURS
Refills: 0 | Status: DISCONTINUED | OUTPATIENT
Start: 2022-06-15 | End: 2022-06-16

## 2022-06-15 RX ORDER — ACETAMINOPHEN 500 MG
1000 TABLET ORAL ONCE
Refills: 0 | Status: DISCONTINUED | OUTPATIENT
Start: 2022-06-15 | End: 2022-06-16

## 2022-06-15 RX ORDER — HEPARIN SODIUM 5000 [USP'U]/ML
5000 INJECTION INTRAVENOUS; SUBCUTANEOUS ONCE
Refills: 0 | Status: COMPLETED | OUTPATIENT
Start: 2022-06-15 | End: 2022-06-15

## 2022-06-15 RX ORDER — ONDANSETRON 8 MG/1
4 TABLET, FILM COATED ORAL EVERY 6 HOURS
Refills: 0 | Status: DISCONTINUED | OUTPATIENT
Start: 2022-06-15 | End: 2022-06-16

## 2022-06-15 RX ORDER — OXYCODONE HYDROCHLORIDE 5 MG/1
10 TABLET ORAL EVERY 4 HOURS
Refills: 0 | Status: DISCONTINUED | OUTPATIENT
Start: 2022-06-15 | End: 2022-06-16

## 2022-06-15 RX ADMIN — HYDROMORPHONE HYDROCHLORIDE 0.5 MILLIGRAM(S): 2 INJECTION INTRAMUSCULAR; INTRAVENOUS; SUBCUTANEOUS at 18:24

## 2022-06-15 RX ADMIN — HYDROMORPHONE HYDROCHLORIDE 0.5 MILLIGRAM(S): 2 INJECTION INTRAMUSCULAR; INTRAVENOUS; SUBCUTANEOUS at 19:05

## 2022-06-15 RX ADMIN — ONDANSETRON 4 MILLIGRAM(S): 8 TABLET, FILM COATED ORAL at 23:07

## 2022-06-15 RX ADMIN — ENOXAPARIN SODIUM 40 MILLIGRAM(S): 100 INJECTION SUBCUTANEOUS at 23:07

## 2022-06-15 RX ADMIN — HEPARIN SODIUM 5000 UNIT(S): 5000 INJECTION INTRAVENOUS; SUBCUTANEOUS at 13:49

## 2022-06-15 RX ADMIN — APREPITANT 80 MILLIGRAM(S): 80 CAPSULE ORAL at 13:49

## 2022-06-15 RX ADMIN — HYDROMORPHONE HYDROCHLORIDE 0.5 MILLIGRAM(S): 2 INJECTION INTRAMUSCULAR; INTRAVENOUS; SUBCUTANEOUS at 19:39

## 2022-06-15 RX ADMIN — PANTOPRAZOLE SODIUM 40 MILLIGRAM(S): 20 TABLET, DELAYED RELEASE ORAL at 18:37

## 2022-06-15 RX ADMIN — ONDANSETRON 4 MILLIGRAM(S): 8 TABLET, FILM COATED ORAL at 20:25

## 2022-06-15 RX ADMIN — HYDROMORPHONE HYDROCHLORIDE 0.5 MILLIGRAM(S): 2 INJECTION INTRAMUSCULAR; INTRAVENOUS; SUBCUTANEOUS at 18:36

## 2022-06-15 RX ADMIN — Medication 30 MILLIGRAM(S): at 23:07

## 2022-06-15 RX ADMIN — OXYCODONE HYDROCHLORIDE 10 MILLIGRAM(S): 5 TABLET ORAL at 19:43

## 2022-06-15 RX ADMIN — HYDROMORPHONE HYDROCHLORIDE 0.5 MILLIGRAM(S): 2 INJECTION INTRAMUSCULAR; INTRAVENOUS; SUBCUTANEOUS at 18:39

## 2022-06-15 RX ADMIN — SODIUM CHLORIDE 150 MILLILITER(S): 9 INJECTION, SOLUTION INTRAVENOUS at 23:07

## 2022-06-15 RX ADMIN — HYDROMORPHONE HYDROCHLORIDE 0.5 MILLIGRAM(S): 2 INJECTION INTRAMUSCULAR; INTRAVENOUS; SUBCUTANEOUS at 18:51

## 2022-06-15 RX ADMIN — HYDROMORPHONE HYDROCHLORIDE 0.5 MILLIGRAM(S): 2 INJECTION INTRAMUSCULAR; INTRAVENOUS; SUBCUTANEOUS at 18:48

## 2022-06-15 RX ADMIN — HYDROMORPHONE HYDROCHLORIDE 0.5 MILLIGRAM(S): 2 INJECTION INTRAMUSCULAR; INTRAVENOUS; SUBCUTANEOUS at 19:15

## 2022-06-15 RX ADMIN — OXYCODONE HYDROCHLORIDE 10 MILLIGRAM(S): 5 TABLET ORAL at 20:38

## 2022-06-15 NOTE — PATIENT PROFILE ADULT - FALL HARM RISK - UNIVERSAL INTERVENTIONS
Bed in lowest position, wheels locked, appropriate side rails in place/Call bell, personal items and telephone in reach/Instruct patient to call for assistance before getting out of bed or chair/Non-slip footwear when patient is out of bed/[place] to call system/Physically safe environment - no spills, clutter or unnecessary equipment/Purposeful Proactive Rounding/Room/bathroom lighting operational, light cord in reach

## 2022-06-15 NOTE — CHART NOTE - NSCHARTNOTEFT_GEN_A_CORE
Operative Note    Patient: Melisa Hayes  : Oct 3, 1990  Surgery date: Reba 15, 2022    Pre-Operative Diagnosis: Refractory morbid obesity with multiple comorbid conditions.    Post-operative Diagnosis: Same, Umbilical Hernia    Primary Procedure  [39169] Fady-En-Y Gastric Bypass - Laparoscopic     Secondary Procedure(s)  [09264] Uppr Gi Endoscopy, Diagnosis   [06682] Transversus Abdominis Plan (TAP) Block Bilateral   [76656] Lap Umbilical Hernia Repair     Surgeon: Sukhwinder Cordero M.D., FACS   Assistant surgeon: Scott Kauffman MD     Anesthesia type: General Anesthesia     ICD9 Code   Diagnosis   [E28.2]   POLYCYSTIC OVARIAN SYNDROME (Stable)   [E66.01]   MORBID SEVERE OBES D/T EXCESS CHAN (Stable)   [K21.0]   GERD WITH ESOPHAGITIS (Stable)   [K44.9]   DIAPH HERNIA W/O OBST/GANGRENE (Stable)   [M54.5]   LOW BACK PAIN (Stable)   [Z68.41]   BODY MASS INDEX 40.0-44.9 ADULT (Stable)     Estimated blood loss: 30 ml     DRAINS: none    COMPLICATIONS: none    INDICATIONS FOR THE PROCEDURE:  The patient is a 31-year-old female who is morbidly obese with a body mass index of 42. The patient had multiple comorbidities due to her morbid obesity including PCOS. The patient tried multiple medical diets to try to lose weight but has been unable to lose any significant amount of weight, so surgical correction of her morbid obesity was indicated. The patient meets NIH criteria fro bariatric surgery and underwent appropriate preoperative workup, education, and signed the consent form freely. The patient had risks and benefits explained including, but not limited to, bleeding, leak, infection, disability, injury to transient structures, aspiration, DVT, pulmonary embolism, and death. The patient understood and agreed to proceed with surgery.    DESCRIPTION OF THE PROCEDURE:  The patient was identified properly via a time-out. The patient was brought into the operating room and was placed on the operating room table in supine position. The patient was then given general endotracheal anesthesia and was given preoperative antibiotics. Preoperative heparin was given in the ambulatory surgery unit. The patient was then prepped and draped in the usual sterile fashion. An Exparel mixture was mixed at the back table with 20 mL of Exparel, 30 mL of 0.25% Marcaine and 150 mL of normal saline. A Veress needle was placed in the left upper quadrant. The abdomen was insufflated to 15 millimeters of mercury. Once the abdomen was insufflated, the Exparel mixture was given subcutaneously at the sites of incision. An incision was made within the umbilicus and a 12-millimeter trocar was placed in the abdomen through an umbilical hernia. The laparoscope was inserted and there was no injury on initial trocar placement or initial Veress needle placement. A Transversus Abdominus Plane Block was then conducted by placing 20 mL of the Exparel mixture preperitoneal at the distribution of the spinal nerves on the lateral abdominal wall. This was started at the costal margin at the mid axillary line. 20cc of the Exparel mixture was placed in this area. Another 20 mL was placed four centimeters caudal to this area. Another 20 mL was placed four centimeters caudal to this area and another 15 mL was placed four centimeters caudal to this area. This was repeated on the opposite side of the body in a similar fashion. The rest of the Exparel was placed into the subcutaneous tissues and preperitoneal at every trocar site. A 5-millimeter and 15-millimeter trocar was placed in the right upper quadrant. A 12-millimeter and 5-millimeter trocar was placed in left upper quadrant. An incision was made in subxiphoid area and a liver retractor was placed to hold the liver anteriorly and superiorly and was held in place using Mediflex device. The patient was then placed into steep reverse Trendelenburg position. The ligament of Treitz was identified after retracting the colon and greater omentum superiorly. The small bowel was measured for 150 cm from the ligament of Treitz and divided using a 60 mm I drive stapler using the tan load. The Fady limb was then measured at this point for 150 cm. The Fady limb was marked using a Penrose drain and was suture to the end of the Fady limb using a 2-0 Surgidac suture. The biliopancreatic limb and the Fady limb were then approximated using two sutures using the Endostitch device. Enterotomies were made in both these limbs. A single firing of the 60 mm purple load was then used to create a functional side to side anastomosis. The enterotomies sites were close using a running 2-0 Vicryl suture using the Endostitch device. On completion of the anastomosis, the mesenteric defects between the small bowel mesentery were closed with the 2-0 Surgidac suture using the Endostitch device. The greater omentum was then divided longitudinally to create space for the Fady limb to come into the anticolic and antigastric position. At this time, this section was done at the angle of His to create a window into the lesser sac using the harmonic scalpel. A window was then created in a perigastric fashion in the lesser sac along the lesser curvature between the second and third veins along the lesser curvature. At this time, the Orogastric tube and esophageal thermometer were removed. A 45 mm purple load was used to create a transverse cut into the stomach for the creation of the pouch and three other vertical firings using the same load were made to complete the 30 mL pouch. The Fady limb and the gastric pouch were then approximated using 2-0 Vicryl suture using the Endostitch device. Enterotomies measuring approximately 1 cm were made in both the gastric pouch and the Fady limb. A completely hand sewn anastomosis was then performed using an inner layer of running Vicryl suture and we then performed an outer layer of running 2-0 Vicryl suture in a Lembert fashion. After completion of the anastomosis, the Fady limb was clamped and in intraoperative endoscopy was performed. It was found that there was no bleeding or stricture at the anastomosis, and after pumping air and submerging the anastomosis underwater no leak was found. Hemostasis was assured. The abdominal cavity was irrigated and suctioned. Fibrin glue was applied to the staple lines. Satisfied with the surgery at this point, the liver retractor was removed under direct vision. The remaining trocars were then removed. The umbilical hernia was closed with a 0 PDS suture in a figue of eight fashion. The skin was then closed with running Monocryl sutures and dermabond was applied over that. The patient tolerated the procedure well. The patient was extubated in the operating room.    Disposition  To recovery room, VS stable.

## 2022-06-15 NOTE — BRIEF OPERATIVE NOTE - OPERATION/FINDINGS
Patient underwent laparoscopic RNY gastric bypass (Fady 150cm & BP  150cm) without any complications. Intraoperative EGD confirmed intact GJ anastomosis that is easily transversed with negative leak test.

## 2022-06-15 NOTE — BRIEF OPERATIVE NOTE - NSICDXBRIEFPROCEDURE_GEN_ALL_CORE_FT
PROCEDURES:  Laparoscopic Fady-en-Y gastric bypass with intraoperative esophagogastroduodenoscopy (EGD) 15-Kunal-2022 18:18:34  Scott Kauffman

## 2022-06-16 ENCOUNTER — TRANSCRIPTION ENCOUNTER (OUTPATIENT)
Age: 32
End: 2022-06-16

## 2022-06-16 ENCOUNTER — NON-APPOINTMENT (OUTPATIENT)
Age: 32
End: 2022-06-16

## 2022-06-16 VITALS
HEART RATE: 70 BPM | TEMPERATURE: 98 F | DIASTOLIC BLOOD PRESSURE: 49 MMHG | SYSTOLIC BLOOD PRESSURE: 119 MMHG | RESPIRATION RATE: 16 BRPM | OXYGEN SATURATION: 98 %

## 2022-06-16 DIAGNOSIS — E66.01 MORBID (SEVERE) OBESITY DUE TO EXCESS CALORIES: ICD-10-CM

## 2022-06-16 LAB
ANION GAP SERPL CALC-SCNC: 8 MMOL/L — SIGNIFICANT CHANGE UP (ref 5–17)
BASOPHILS # BLD AUTO: 0 K/UL — SIGNIFICANT CHANGE UP (ref 0–0.2)
BASOPHILS NFR BLD AUTO: 0 % — SIGNIFICANT CHANGE UP (ref 0–2)
BUN SERPL-MCNC: 5 MG/DL — LOW (ref 7–23)
CALCIUM SERPL-MCNC: 8.5 MG/DL — SIGNIFICANT CHANGE UP (ref 8.5–10.1)
CHLORIDE SERPL-SCNC: 109 MMOL/L — HIGH (ref 96–108)
CO2 SERPL-SCNC: 24 MMOL/L — SIGNIFICANT CHANGE UP (ref 22–31)
CREAT SERPL-MCNC: 0.54 MG/DL — SIGNIFICANT CHANGE UP (ref 0.5–1.3)
EGFR: 125 ML/MIN/1.73M2 — SIGNIFICANT CHANGE UP
EOSINOPHIL # BLD AUTO: 0 K/UL — SIGNIFICANT CHANGE UP (ref 0–0.5)
EOSINOPHIL NFR BLD AUTO: 0 % — SIGNIFICANT CHANGE UP (ref 0–6)
GLUCOSE SERPL-MCNC: 129 MG/DL — HIGH (ref 70–99)
HCT VFR BLD CALC: 33.6 % — LOW (ref 34.5–45)
HGB BLD-MCNC: 10.7 G/DL — LOW (ref 11.5–15.5)
IMM GRANULOCYTES NFR BLD AUTO: 0.3 % — SIGNIFICANT CHANGE UP (ref 0–1.5)
LYMPHOCYTES # BLD AUTO: 0.77 K/UL — LOW (ref 1–3.3)
LYMPHOCYTES # BLD AUTO: 11 % — LOW (ref 13–44)
MCHC RBC-ENTMCNC: 30.6 PG — SIGNIFICANT CHANGE UP (ref 27–34)
MCHC RBC-ENTMCNC: 31.8 GM/DL — LOW (ref 32–36)
MCV RBC AUTO: 96 FL — SIGNIFICANT CHANGE UP (ref 80–100)
MONOCYTES # BLD AUTO: 0.45 K/UL — SIGNIFICANT CHANGE UP (ref 0–0.9)
MONOCYTES NFR BLD AUTO: 6.4 % — SIGNIFICANT CHANGE UP (ref 2–14)
NEUTROPHILS # BLD AUTO: 5.75 K/UL — SIGNIFICANT CHANGE UP (ref 1.8–7.4)
NEUTROPHILS NFR BLD AUTO: 82.3 % — HIGH (ref 43–77)
PLATELET # BLD AUTO: 270 K/UL — SIGNIFICANT CHANGE UP (ref 150–400)
POTASSIUM SERPL-MCNC: 3.8 MMOL/L — SIGNIFICANT CHANGE UP (ref 3.5–5.3)
POTASSIUM SERPL-SCNC: 3.8 MMOL/L — SIGNIFICANT CHANGE UP (ref 3.5–5.3)
RBC # BLD: 3.5 M/UL — LOW (ref 3.8–5.2)
RBC # FLD: 13.5 % — SIGNIFICANT CHANGE UP (ref 10.3–14.5)
SODIUM SERPL-SCNC: 141 MMOL/L — SIGNIFICANT CHANGE UP (ref 135–145)
WBC # BLD: 6.99 K/UL — SIGNIFICANT CHANGE UP (ref 3.8–10.5)
WBC # FLD AUTO: 6.99 K/UL — SIGNIFICANT CHANGE UP (ref 3.8–10.5)

## 2022-06-16 RX ADMIN — Medication 30 MILLIGRAM(S): at 11:36

## 2022-06-16 RX ADMIN — ONDANSETRON 4 MILLIGRAM(S): 8 TABLET, FILM COATED ORAL at 11:04

## 2022-06-16 RX ADMIN — Medication 30 MILLIGRAM(S): at 11:04

## 2022-06-16 RX ADMIN — ENOXAPARIN SODIUM 40 MILLIGRAM(S): 100 INJECTION SUBCUTANEOUS at 11:04

## 2022-06-16 RX ADMIN — SODIUM CHLORIDE 150 MILLILITER(S): 9 INJECTION, SOLUTION INTRAVENOUS at 05:39

## 2022-06-16 RX ADMIN — OXYCODONE HYDROCHLORIDE 10 MILLIGRAM(S): 5 TABLET ORAL at 03:38

## 2022-06-16 RX ADMIN — Medication 30 MILLIGRAM(S): at 06:10

## 2022-06-16 RX ADMIN — OXYCODONE HYDROCHLORIDE 10 MILLIGRAM(S): 5 TABLET ORAL at 04:38

## 2022-06-16 RX ADMIN — Medication 30 MILLIGRAM(S): at 05:40

## 2022-06-16 RX ADMIN — Medication 30 MILLIGRAM(S): at 01:03

## 2022-06-16 RX ADMIN — ONDANSETRON 4 MILLIGRAM(S): 8 TABLET, FILM COATED ORAL at 05:39

## 2022-06-16 NOTE — DIETITIAN INITIAL EVALUATION ADULT - PERTINENT LABORATORY DATA
06-16    141  |  109<H>  |  5<L>  ----------------------------<  129<H>  3.8   |  24  |  0.54    Ca    8.5      16 Jun 2022 08:50    POCT Blood Glucose.: 189 mg/dL (06-16-22 @ 06:09)

## 2022-06-16 NOTE — PROGRESS NOTE ADULT - SUBJECTIVE AND OBJECTIVE BOX
Post Op Day#: 1  Procedure:  Laparoscopic Gastric Bypass    The patient is doing well without complaints.    Vital Signs Last 24 Hrs  T(C): 36.4 (16 Jun 2022 09:30), Max: 37 (15 Kunal 2022 21:15)  T(F): 97.6 (16 Jun 2022 09:30), Max: 98.6 (15 Kunal 2022 21:15)  HR: 70 (16 Jun 2022 09:30) (70 - 97)  BP: 119/49 (16 Jun 2022 09:30) (100/68 - 129/76)  BP(mean): --  RR: 16 (16 Jun 2022 09:30) (10 - 16)  SpO2: 98% (16 Jun 2022 09:30) (98% - 100%)    PHYSICAL EXAM:  General: NAD.  HEENT: no JVD, no jaundice.  LUNGS: CTAB.  Heart: S1 S2 RRR  Abd: soft nt/nd   Wounds: clean dry and intact                          10.7   6.99  )-----------( 270      ( 16 Jun 2022 08:50 )             33.6       06-16    141  |  109<H>  |  5<L>  ----------------------------<  129<H>  3.8   |  24  |  0.54    Ca    8.5      16 Jun 2022 08:50

## 2022-06-16 NOTE — DIETITIAN INITIAL EVALUATION ADULT - PERTINENT MEDS FT
MEDICATIONS  (STANDING):  acetaminophen   IVPB .. 1000 milliGRAM(s) IV Intermittent once  enoxaparin Injectable 40 milliGRAM(s) SubCutaneous every 12 hours  lactated ringers. 1000 milliLiter(s) (150 mL/Hr) IV Continuous <Continuous>  ondansetron Injectable 4 milliGRAM(s) IV Push every 6 hours  pantoprazole  Injectable 40 milliGRAM(s) IV Push every 24 hours    MEDICATIONS  (PRN):  LORazepam   Injectable 0.5 milliGRAM(s) IV Push every 6 hours PRN Anxiety  oxyCODONE    Solution 5 milliGRAM(s) Oral every 4 hours PRN Mild Pain (1 - 3)  oxyCODONE    Solution 10 milliGRAM(s) Oral every 4 hours PRN Moderate Pain (4 - 6)

## 2022-06-16 NOTE — PHYSICAL EXAM
[Normal Sclera/Conjunctiva] : normal sclera/conjunctiva [No Respiratory Distress] : no respiratory distress  [No Accessory Muscle Use] : no accessory muscle use [Clear to Auscultation] : lungs were clear to auscultation bilaterally [Normal] : affect was normal and insight and judgment were intact [de-identified] : Grossly normal [de-identified] : No edema

## 2022-06-16 NOTE — HISTORY OF PRESENT ILLNESS
[FreeTextEntry1] : Follow-up/presurgical clearance [de-identified] : Patient is here for presurgical clearance for gastric bypass scheduled for July 15.\par Anxiety control.\par Migraine/arachnoid cyst was cleared by neurosurgery to have bariatric surgery.  Headaches have been stable has not needed to use Fioricet in a few months\par GERD/hiatal hernia has been stable with lifestyle modifications.  Planning to address hiatal hernia with bariatric surgery.\par Complaining of feeling more constipated secondary to liquid diet\par Otherwise denies having any urinary symptoms.  Has had blood in the urine in the past.  Has regular periods.\par Patient denies abnormal cough in the past and denies history of perioperative bleeding.  Has multiple sutures past with unremarkable outcomes\par

## 2022-06-16 NOTE — PROGRESS NOTE ADULT - PROBLEM SELECTOR PLAN 1
The patient is s/p lap gastric bypass and doing very well.  All discharge instructions were given to the patient, as well as potential signs of complications.  The patient will follow up in 2 weeks.  [facility]

## 2022-06-16 NOTE — DIETITIAN INITIAL EVALUATION ADULT - OTHER INFO
· Operative Findings	Patient underwent laparoscopic RNY gastric bypass (Fady 150cm & BP  150cm) without any complications. Intraoperative EGD confirmed intact GJ anastomosis that is easily transversed with negative leak test.

## 2022-06-16 NOTE — DISCHARGE NOTE NURSING/CASE MANAGEMENT/SOCIAL WORK - PATIENT PORTAL LINK FT
You can access the FollowMyHealth Patient Portal offered by [facility] by registering at the following website: [email or web address]. By joining [facility]’s FollowMyHealth portal, you will also be able to view your health information using other applications (apps) compatible with our system.

## 2022-06-16 NOTE — ASSESSMENT
[FreeTextEntry1] : Anxiety stable continue Xanax as needed.\par Arachnoid cyst/migraines cleared by neurosurgery to bariatric procedure.  Symptoms controlled asymptomatic.\par Constipation increase fiber water.\par GERD stable continue current regimen.\par Obesity related to have gastric bypass.\par Urine abnormality unclear if related to periods check.  Culture rule out infection repeat urinalysis.  Increase hydration.\par Ovulation test abnormality mild elevation in APTT PT/INR.  Unclear etiology however mild.  We will repeat labs.\par Preoperative clearance repeat call ox check pregnancy test.  Clearance pending results\par Hiatal hernia stable awaiting to get repaired during gastric bypass\par Pending results

## 2022-06-16 NOTE — DISCHARGE NOTE PROVIDER - NSDCCPTREATMENT_GEN_ALL_CORE_FT
PRINCIPAL PROCEDURE  Procedure: Laparoscopic Fady-en-Y gastric bypass with intraoperative esophagogastroduodenoscopy (EGD)  Findings and Treatment:

## 2022-06-16 NOTE — DIETITIAN INITIAL EVALUATION ADULT - NS FNS DIET ORDER
Diet, Clear Liquid:   Bariatric Clear Liquid (BARICLLIQ)     Special Instructions for Nursing:  Bariatric Clear Liquid,  start 6 hours postop if no nausea vomiting (06-15-22 @ 15:55)

## 2022-06-16 NOTE — PROVIDER CONTACT NOTE (OTHER) - SITUATION
notified Dr Hooks's office of admission please fax over discharge paperwork once patient is discharged to [phone]

## 2022-06-16 NOTE — DISCHARGE NOTE PROVIDER - NSDCMRMEDTOKEN_GEN_ALL_CORE_FT
Fioricet oral tablet: 1 tab(s) orally every 4 hours, As Needed  Multiple Vitamins oral tablet: 1 tab(s) orally once a day  Vitamin D3 125 mcg (5000 intl units) oral capsule: 1 cap(s) orally once a day  Xanax 0.5 mg oral tablet: 1 tab(s) orally 3 times a day, As Needed

## 2022-06-16 NOTE — DISCHARGE NOTE PROVIDER - CARE PROVIDER_API CALL
Sukhwinder Cordero)  Surgery  [address], [address]  [place] [address]  Phone: [phone]  Fax: [phone]  Follow Up Time: 2 weeks

## 2022-06-16 NOTE — DISCHARGE NOTE NURSING/CASE MANAGEMENT/SOCIAL WORK - NSDCPEFALRISK_GEN_ALL_CORE
For information on Fall & Injury Prevention, visit: [email or web address] OR  [email or web address] OR  https://www.cdc.gov/steadi/patient.html

## 2022-06-16 NOTE — DISCHARGE NOTE PROVIDER - HOSPITAL COURSE
Patient is an 30 yo F that underwent laparoscopic gastric bypass without any complications. Patient is currently doing very well, pain controlled, and is tolerating phase I bariatric diet. Patient has had uncomplicated hospital course and is stable for discharge home with follow-up in the office in 2 weeks.

## 2022-06-19 DIAGNOSIS — G89.18 OTHER ACUTE POSTPROCEDURAL PAIN: ICD-10-CM

## 2022-06-20 DIAGNOSIS — E28.2 POLYCYSTIC OVARIAN SYNDROME: ICD-10-CM

## 2022-06-20 DIAGNOSIS — K44.9 DIAPHRAGMATIC HERNIA WITHOUT OBSTRUCTION OR GANGRENE: ICD-10-CM

## 2022-06-20 DIAGNOSIS — F41.9 ANXIETY DISORDER, UNSPECIFIED: ICD-10-CM

## 2022-06-20 DIAGNOSIS — E66.01 MORBID (SEVERE) OBESITY DUE TO EXCESS CALORIES: ICD-10-CM

## 2022-06-20 DIAGNOSIS — K21.9 GASTRO-ESOPHAGEAL REFLUX DISEASE WITHOUT ESOPHAGITIS: ICD-10-CM

## 2022-06-20 DIAGNOSIS — R74.8 ABNORMAL LEVELS OF OTHER SERUM ENZYMES: ICD-10-CM

## 2022-06-25 ENCOUNTER — INPATIENT (INPATIENT)
Facility: HOSPITAL | Age: 32
LOS: 0 days | Discharge: HOME | DRG: 395 | End: 2022-06-26
Attending: SURGERY | Admitting: SURGERY
Payer: COMMERCIAL

## 2022-06-25 VITALS — WEIGHT: 197.09 LBS | HEIGHT: 62 IN

## 2022-06-25 DIAGNOSIS — Z90.49 ACQUIRED ABSENCE OF OTHER SPECIFIED PARTS OF DIGESTIVE TRACT: Chronic | ICD-10-CM

## 2022-06-25 DIAGNOSIS — K66.8 OTHER SPECIFIED DISORDERS OF PERITONEUM: ICD-10-CM

## 2022-06-25 PROBLEM — G43.909 MIGRAINE, UNSPECIFIED, NOT INTRACTABLE, WITHOUT STATUS MIGRAINOSUS: Chronic | Status: ACTIVE | Noted: 2022-06-08

## 2022-06-25 PROBLEM — G93.0 CEREBRAL CYSTS: Chronic | Status: ACTIVE | Noted: 2022-06-08

## 2022-06-25 PROBLEM — K58.9 IRRITABLE BOWEL SYNDROME WITHOUT DIARRHEA: Chronic | Status: ACTIVE | Noted: 2022-06-08

## 2022-06-25 PROBLEM — U07.1 COVID-19: Chronic | Status: ACTIVE | Noted: 2022-06-08

## 2022-06-25 PROBLEM — E28.2 POLYCYSTIC OVARIAN SYNDROME: Chronic | Status: ACTIVE | Noted: 2022-06-08

## 2022-06-25 PROBLEM — F41.9 ANXIETY DISORDER, UNSPECIFIED: Chronic | Status: ACTIVE | Noted: 2022-06-08

## 2022-06-25 PROBLEM — K44.9 DIAPHRAGMATIC HERNIA WITHOUT OBSTRUCTION OR GANGRENE: Chronic | Status: ACTIVE | Noted: 2022-06-08

## 2022-06-25 PROBLEM — E66.01 MORBID (SEVERE) OBESITY DUE TO EXCESS CALORIES: Chronic | Status: ACTIVE | Noted: 2022-06-08

## 2022-06-25 LAB
ALBUMIN SERPL ELPH-MCNC: 3.8 G/DL — SIGNIFICANT CHANGE UP (ref 3.3–5)
ALP SERPL-CCNC: 108 U/L — SIGNIFICANT CHANGE UP (ref 40–120)
ALT FLD-CCNC: 121 U/L — HIGH (ref 12–78)
ANION GAP SERPL CALC-SCNC: 11 MMOL/L — SIGNIFICANT CHANGE UP (ref 5–17)
APPEARANCE UR: CLEAR — SIGNIFICANT CHANGE UP
AST SERPL-CCNC: 58 U/L — HIGH (ref 15–37)
BASOPHILS # BLD AUTO: 0.02 K/UL — SIGNIFICANT CHANGE UP (ref 0–0.2)
BASOPHILS NFR BLD AUTO: 0.3 % — SIGNIFICANT CHANGE UP (ref 0–2)
BILIRUB SERPL-MCNC: 0.4 MG/DL — SIGNIFICANT CHANGE UP (ref 0.2–1.2)
BILIRUB UR-MCNC: NEGATIVE — SIGNIFICANT CHANGE UP
BUN SERPL-MCNC: 16 MG/DL — SIGNIFICANT CHANGE UP (ref 7–23)
CALCIUM SERPL-MCNC: 9.4 MG/DL — SIGNIFICANT CHANGE UP (ref 8.5–10.1)
CHLORIDE SERPL-SCNC: 106 MMOL/L — SIGNIFICANT CHANGE UP (ref 96–108)
CO2 SERPL-SCNC: 22 MMOL/L — SIGNIFICANT CHANGE UP (ref 22–31)
COLOR SPEC: YELLOW — SIGNIFICANT CHANGE UP
CREAT SERPL-MCNC: 0.69 MG/DL — SIGNIFICANT CHANGE UP (ref 0.5–1.3)
DIFF PNL FLD: ABNORMAL
EGFR: 117 ML/MIN/1.73M2 — SIGNIFICANT CHANGE UP
EOSINOPHIL # BLD AUTO: 0.09 K/UL — SIGNIFICANT CHANGE UP (ref 0–0.5)
EOSINOPHIL NFR BLD AUTO: 1.2 % — SIGNIFICANT CHANGE UP (ref 0–6)
FLUAV AG NPH QL: SIGNIFICANT CHANGE UP
FLUBV AG NPH QL: SIGNIFICANT CHANGE UP
GLUCOSE SERPL-MCNC: 101 MG/DL — HIGH (ref 70–99)
GLUCOSE UR QL: NEGATIVE — SIGNIFICANT CHANGE UP
HCG SERPL-ACNC: <1 MIU/ML — SIGNIFICANT CHANGE UP
HCT VFR BLD CALC: 40.9 % — SIGNIFICANT CHANGE UP (ref 34.5–45)
HGB BLD-MCNC: 13.2 G/DL — SIGNIFICANT CHANGE UP (ref 11.5–15.5)
IMM GRANULOCYTES NFR BLD AUTO: 0.4 % — SIGNIFICANT CHANGE UP (ref 0–1.5)
KETONES UR-MCNC: ABNORMAL
LACTATE SERPL-SCNC: 0.9 MMOL/L — SIGNIFICANT CHANGE UP (ref 0.7–2)
LEUKOCYTE ESTERASE UR-ACNC: NEGATIVE — SIGNIFICANT CHANGE UP
LIDOCAIN IGE QN: 235 U/L — SIGNIFICANT CHANGE UP (ref 73–393)
LYMPHOCYTES # BLD AUTO: 1.37 K/UL — SIGNIFICANT CHANGE UP (ref 1–3.3)
LYMPHOCYTES # BLD AUTO: 18.9 % — SIGNIFICANT CHANGE UP (ref 13–44)
MCHC RBC-ENTMCNC: 31 PG — SIGNIFICANT CHANGE UP (ref 27–34)
MCHC RBC-ENTMCNC: 32.3 GM/DL — SIGNIFICANT CHANGE UP (ref 32–36)
MCV RBC AUTO: 96 FL — SIGNIFICANT CHANGE UP (ref 80–100)
MONOCYTES # BLD AUTO: 0.67 K/UL — SIGNIFICANT CHANGE UP (ref 0–0.9)
MONOCYTES NFR BLD AUTO: 9.2 % — SIGNIFICANT CHANGE UP (ref 2–14)
NEUTROPHILS # BLD AUTO: 5.08 K/UL — SIGNIFICANT CHANGE UP (ref 1.8–7.4)
NEUTROPHILS NFR BLD AUTO: 70 % — SIGNIFICANT CHANGE UP (ref 43–77)
NITRITE UR-MCNC: NEGATIVE — SIGNIFICANT CHANGE UP
PH UR: 5 — SIGNIFICANT CHANGE UP (ref 5–8)
PLATELET # BLD AUTO: 360 K/UL — SIGNIFICANT CHANGE UP (ref 150–400)
POTASSIUM SERPL-MCNC: 3.7 MMOL/L — SIGNIFICANT CHANGE UP (ref 3.5–5.3)
POTASSIUM SERPL-SCNC: 3.7 MMOL/L — SIGNIFICANT CHANGE UP (ref 3.5–5.3)
PROT SERPL-MCNC: 8.5 GM/DL — HIGH (ref 6–8.3)
PROT UR-MCNC: 30 MG/DL
RBC # BLD: 4.26 M/UL — SIGNIFICANT CHANGE UP (ref 3.8–5.2)
RBC # FLD: 13.7 % — SIGNIFICANT CHANGE UP (ref 10.3–14.5)
RSV RNA NPH QL NAA+NON-PROBE: SIGNIFICANT CHANGE UP
SARS-COV-2 RNA SPEC QL NAA+PROBE: SIGNIFICANT CHANGE UP
SODIUM SERPL-SCNC: 139 MMOL/L — SIGNIFICANT CHANGE UP (ref 135–145)
SP GR SPEC: 1.01 — SIGNIFICANT CHANGE UP (ref 1.01–1.02)
TROPONIN I, HIGH SENSITIVITY RESULT: <3 NG/L — SIGNIFICANT CHANGE UP
UROBILINOGEN FLD QL: NEGATIVE — SIGNIFICANT CHANGE UP
WBC # BLD: 7.26 K/UL — SIGNIFICANT CHANGE UP (ref 3.8–10.5)
WBC # FLD AUTO: 7.26 K/UL — SIGNIFICANT CHANGE UP (ref 3.8–10.5)

## 2022-06-25 PROCEDURE — 99285 EMERGENCY DEPT VISIT HI MDM: CPT

## 2022-06-25 PROCEDURE — 93010 ELECTROCARDIOGRAM REPORT: CPT | Mod: 76

## 2022-06-25 PROCEDURE — 84100 ASSAY OF PHOSPHORUS: CPT

## 2022-06-25 PROCEDURE — 93005 ELECTROCARDIOGRAM TRACING: CPT

## 2022-06-25 PROCEDURE — 74176 CT ABD & PELVIS W/O CONTRAST: CPT

## 2022-06-25 PROCEDURE — 80048 BASIC METABOLIC PNL TOTAL CA: CPT

## 2022-06-25 PROCEDURE — 74177 CT ABD & PELVIS W/CONTRAST: CPT | Mod: 26,MA

## 2022-06-25 PROCEDURE — 36415 COLL VENOUS BLD VENIPUNCTURE: CPT

## 2022-06-25 PROCEDURE — 87086 URINE CULTURE/COLONY COUNT: CPT

## 2022-06-25 PROCEDURE — 85027 COMPLETE CBC AUTOMATED: CPT

## 2022-06-25 PROCEDURE — 81001 URINALYSIS AUTO W/SCOPE: CPT

## 2022-06-25 PROCEDURE — 71045 X-RAY EXAM CHEST 1 VIEW: CPT | Mod: 26

## 2022-06-25 PROCEDURE — 83735 ASSAY OF MAGNESIUM: CPT

## 2022-06-25 PROCEDURE — C9113: CPT

## 2022-06-25 RX ORDER — TRAMADOL HYDROCHLORIDE 50 MG/1
1 TABLET ORAL
Qty: 0 | Refills: 0 | DISCHARGE

## 2022-06-25 RX ORDER — MULTIVIT-MIN/FERROUS GLUCONATE 9 MG/15 ML
1 LIQUID (ML) ORAL
Qty: 0 | Refills: 0 | DISCHARGE

## 2022-06-25 RX ORDER — SODIUM CHLORIDE 9 MG/ML
1000 INJECTION, SOLUTION INTRAVENOUS
Refills: 0 | Status: DISCONTINUED | OUTPATIENT
Start: 2022-06-25 | End: 2022-06-26

## 2022-06-25 RX ORDER — KETOROLAC TROMETHAMINE 30 MG/ML
15 SYRINGE (ML) INJECTION EVERY 8 HOURS
Refills: 0 | Status: DISCONTINUED | OUTPATIENT
Start: 2022-06-25 | End: 2022-06-26

## 2022-06-25 RX ORDER — PANTOPRAZOLE SODIUM 20 MG/1
40 TABLET, DELAYED RELEASE ORAL DAILY
Refills: 0 | Status: DISCONTINUED | OUTPATIENT
Start: 2022-06-25 | End: 2022-06-26

## 2022-06-25 RX ORDER — ACETAMINOPHEN 500 MG
1000 TABLET ORAL ONCE
Refills: 0 | Status: COMPLETED | OUTPATIENT
Start: 2022-06-25 | End: 2022-06-25

## 2022-06-25 RX ORDER — ALPRAZOLAM 0.25 MG
1 TABLET ORAL
Qty: 0 | Refills: 0 | DISCHARGE

## 2022-06-25 RX ORDER — MORPHINE SULFATE 50 MG/1
2 CAPSULE, EXTENDED RELEASE ORAL EVERY 4 HOURS
Refills: 0 | Status: DISCONTINUED | OUTPATIENT
Start: 2022-06-25 | End: 2022-06-26

## 2022-06-25 RX ORDER — ONDANSETRON 8 MG/1
4 TABLET, FILM COATED ORAL EVERY 8 HOURS
Refills: 0 | Status: DISCONTINUED | OUTPATIENT
Start: 2022-06-25 | End: 2022-06-26

## 2022-06-25 RX ORDER — ONDANSETRON 8 MG/1
4 TABLET, FILM COATED ORAL ONCE
Refills: 0 | Status: COMPLETED | OUTPATIENT
Start: 2022-06-25 | End: 2022-06-25

## 2022-06-25 RX ORDER — MORPHINE SULFATE 50 MG/1
4 CAPSULE, EXTENDED RELEASE ORAL ONCE
Refills: 0 | Status: DISCONTINUED | OUTPATIENT
Start: 2022-06-25 | End: 2022-06-25

## 2022-06-25 RX ORDER — PANTOPRAZOLE SODIUM 20 MG/1
1 TABLET, DELAYED RELEASE ORAL
Qty: 0 | Refills: 0 | DISCHARGE

## 2022-06-25 RX ORDER — SODIUM CHLORIDE 9 MG/ML
1000 INJECTION INTRAMUSCULAR; INTRAVENOUS; SUBCUTANEOUS ONCE
Refills: 0 | Status: COMPLETED | OUTPATIENT
Start: 2022-06-25 | End: 2022-06-25

## 2022-06-25 RX ORDER — CHOLECALCIFEROL (VITAMIN D3) 125 MCG
1 CAPSULE ORAL
Qty: 0 | Refills: 0 | DISCHARGE

## 2022-06-25 RX ORDER — ACETAMINOPHEN 500 MG
1 TABLET ORAL
Qty: 0 | Refills: 0 | DISCHARGE

## 2022-06-25 RX ORDER — ENOXAPARIN SODIUM 100 MG/ML
40 INJECTION SUBCUTANEOUS EVERY 24 HOURS
Refills: 0 | Status: DISCONTINUED | OUTPATIENT
Start: 2022-06-25 | End: 2022-06-26

## 2022-06-25 RX ADMIN — ONDANSETRON 4 MILLIGRAM(S): 8 TABLET, FILM COATED ORAL at 07:02

## 2022-06-25 RX ADMIN — Medication 400 MILLIGRAM(S): at 08:32

## 2022-06-25 RX ADMIN — MORPHINE SULFATE 4 MILLIGRAM(S): 50 CAPSULE, EXTENDED RELEASE ORAL at 07:02

## 2022-06-25 RX ADMIN — Medication 15 MILLIGRAM(S): at 10:25

## 2022-06-25 RX ADMIN — Medication 1000 MILLIGRAM(S): at 22:25

## 2022-06-25 RX ADMIN — Medication 400 MILLIGRAM(S): at 15:42

## 2022-06-25 RX ADMIN — Medication 15 MILLIGRAM(S): at 10:55

## 2022-06-25 RX ADMIN — PANTOPRAZOLE SODIUM 40 MILLIGRAM(S): 20 TABLET, DELAYED RELEASE ORAL at 11:42

## 2022-06-25 RX ADMIN — SODIUM CHLORIDE 150 MILLILITER(S): 9 INJECTION, SOLUTION INTRAVENOUS at 10:25

## 2022-06-25 RX ADMIN — Medication 1000 MILLIGRAM(S): at 16:15

## 2022-06-25 RX ADMIN — Medication 15 MILLIGRAM(S): at 18:54

## 2022-06-25 RX ADMIN — Medication 400 MILLIGRAM(S): at 22:10

## 2022-06-25 RX ADMIN — ENOXAPARIN SODIUM 40 MILLIGRAM(S): 100 INJECTION SUBCUTANEOUS at 09:49

## 2022-06-25 RX ADMIN — SODIUM CHLORIDE 1000 MILLILITER(S): 9 INJECTION INTRAMUSCULAR; INTRAVENOUS; SUBCUTANEOUS at 07:02

## 2022-06-25 RX ADMIN — Medication 15 MILLIGRAM(S): at 19:25

## 2022-06-25 NOTE — ED ADULT NURSE REASSESSMENT NOTE - NS ED NURSE REASSESS COMMENT FT1
Report received from Martin wilson. Pt recently medicated for pain. Will reassess. Pt in no acute distress at this time. SO at bedside. call bell within reach. will ctm.

## 2022-06-25 NOTE — ED PROVIDER NOTE - PROGRESS NOTE DETAILS
Attending MD Sabillon : Spoke to Dr Cordero from Bariatric surgery. Agree with plan - requested protocol for CTAP adjusted to bariatric protocol. Attending MD Sabillon : CTr noted. Patient informed. Pain improved mldly but still present. Will admit per surg resident recs.

## 2022-06-25 NOTE — H&P ADULT - HISTORY OF PRESENT ILLNESS
31 F POD 10 s/p laparoscopic RNY gastric bypass now presenting wt acute onset LUQ abdominal pain sharp radiating to the right side that awoke her from sleep at 2 am and is not improving with meds prescribed at home. Passing flatus. States that she was improving up until now. Has been following post surgical instructions. No dysuria, hematuria, nausea. Passing flatus. CT scan showed pneumoperitoneum.

## 2022-06-25 NOTE — H&P ADULT - NSHPLABSRESULTS_GEN_ALL_CORE
13.2   7.26  )-----------( 360      ( 25 Jun 2022 06:48 )             40.9   06-25      139  |  106  |  16  ----------------------------<  101<H>  3.7   |  22  |  0.69    Ca    9.4      25 Jun 2022 06:48    TPro  8.5<H>  /  Alb  3.8  /  TBili  0.4  /  DBili  x   /  AST  58<H>  /  ALT  121<H>  /  AlkPhos  108  06-25    Imaging:     ACC: [id number] EXAM: CT ABDOMEN AND PELVIS OC IC    PROCEDURE DATE: 06/25/2022        INTERPRETATION: CLINICAL INFORMATION: Recent Fady-en-Y. Evaluate obstruction versus perforation.  Postop day 10 status post laparoscopic Fady-en-Y gastric bypass.  COMPARISON: September 09, 2021    CONTRAST/COMPLICATIONS:  IV Contrast: Omnipaque 350 90 cc administered 10 cc discarded  Oral Contrast: NONE  Complications: None reported at time of study completion    PROCEDURE:  CT of the Abdomen and Pelvis was performed.  Sagittal and coronal reformats were performed.    FINDINGS:  LOWER CHEST: Within normal limits.    LIVER: Within normal limits.  BILE DUCTS: Normal caliber.  GALLBLADDER: Cholecystectomy.  SPLEEN: Within normal limits.  PANCREAS: Within normal limits.  ADRENALS: Within normal limits.  KIDNEYS/URETERS: Within normal limits.    BLADDER: Within normal limits.  REPRODUCTIVE ORGANS: Normal uterus and adnexa    BOWEL: Fady-en-Y gastric bypass. Contrast flows freely from the pouch into the small bowel. No evidence of obstruction. No extravasation of contrast material to suggest leak at the gastrojejunostomy. The cecum is inverted and within the midline slightly dilated. Appendix is not visualized, there is no evidence of inflammation at the base of the cecum. Small bowel anastomosis within the LEFT upper quadrant. Contrast is seen at the level of the anastomosis without evidence of extravasation or leak.  PERITONEUM: There is moderate pneumoperitoneum which is greater than expected for postop day 10. No source for the pneumoperitoneum is identified. No extravasation of contrast is seen.  VESSELS: Within normal limits.  RETROPERITONEUM/LYMPH NODES: No lymphadenopathy.  ABDOMINAL WALL: Fat-containing periumbilical hernia.  BONES: Within normal limits.    IMPRESSION:  Pneumoperitoneum larger than what is expected postop day #10. However, there is no extravasation of contrast material from the gastrojejunostomy or the jejunojejunostomy to identify source of leak. No bowel obstruction. Surgical consult recommended. Consider follow-up study in 24 to 48 hours.  Fat-containing periumbilical hernia.

## 2022-06-25 NOTE — ED PROVIDER NOTE - CLINICAL SUMMARY MEDICAL DECISION MAKING FREE TEXT BOX
Attending MD Sabillon : 31 F POD 10 s/p laparoscopic RNY gastric bypass. Ddx includes SBO vs surgical complicaton vs other intrabdominal pathology. Doubt PE given patient's surgery duration approx 2 hours, not orthopedic, no LE swelling c/w DVT and patient's pain is reproduced with abdominal palpation. Will obtain CTAP to assess the above, and contact bariatric surgery.

## 2022-06-25 NOTE — H&P ADULT - NSHPPHYSICALEXAM_GEN_ALL_CORE
PHYSICAL EXAM:  GENERAL: NAD  HEENT:  Atraumatic  CHEST/LUNG: Chest rise equal bilaterally  HEART: Tachycardic  ABDOMEN: Generalized abdominal TTP, surgical incisions CDI.   EXTREMITIES:  Extremities warm  PSYCH: A&Ox3.  SKIN: No obvious rashes or lesions. Rectal temp without fever.

## 2022-06-25 NOTE — ED PROVIDER NOTE - OBJECTIVE STATEMENT
Attending MD Sabillon : 31 F POD 10 s/p laparoscopic RNY gastric bypass now presenting wt acute onset LUQ abdominal pain sharp radiating to the right side that awoke her from sleep at 2 am and is not improving with meds prescribed at home. Passing flatus. States that she was improving up until now. Has been following post surgical instructions. No dysuria, hematuria, nausea. Passing flatus.

## 2022-06-25 NOTE — ED ADULT TRIAGE NOTE - CHIEF COMPLAINT QUOTE
Pt ambulatory to triage, c/o sudden onset of sharp abdominal pain and b/l shoulder pain starting last night. Recently had gastric bypass surgery on 6/15. Took Tramadol and Tylenol with no relief. +passing gas. Endorses nausea. Denies fevers.

## 2022-06-25 NOTE — PATIENT PROFILE ADULT - FALL HARM RISK - UNIVERSAL INTERVENTIONS
Bed in lowest position, wheels locked, appropriate side rails in place/Call bell, personal items and telephone in reach/Instruct patient to call for assistance before getting out of bed or chair/Non-slip footwear when patient is out of bed/[place] to call system/Physically safe environment - no spills, clutter or unnecessary equipment/Purposeful Proactive Rounding/Room/bathroom lighting operational, light cord in reach

## 2022-06-25 NOTE — ED ADULT NURSE NOTE - OBJECTIVE STATEMENT
Pt brought to ED from home with complaints of abdominal pain, 5/10. Pt's boyfriend at bedside. Pt states that she has had this pain for the past two days, but it has become intolerable. Pt is post-op gastric sleeve, 6/15. Pt has had very little oral intake. Pt is ambulatory. A&O x4. Airway is patent, breathing is even and unlabored. Pt denies shortness of breath and difficulty breathing. Skin is warm and dry. PMS x4 extremities. Pt states she has "a little bit of" chest pain. Pt endorses numbness and tingling in her legs, denies arms. Pt denies blurred vision. Pt denies headaches. Pt denies nausea and vomiting, endorses diarrhea. 20G IV placed to left AC, flushes without difficulty. Blood and COVID swab sent to lab for analysis. Pending urine, Pt is aware. NS fluids running. Administered zofran and morphine as per order. Pt provided with oral contrast by radiology. No additional requests or complaints. Patient safety maintained. Call bell within reach. Will continue to monitor.

## 2022-06-25 NOTE — ED ADULT NURSE NOTE - IN THE PAST 12 MONTHS HAVE YOU USED DRUGS OTHER THAN THOSE REQUIRED FOR MEDICAL REASON?
Ericka Stafford is a new  62 year old female   Primary Care Physician: Maryse Ambrose MD    Chief Complaint   Patient presents with   • Derm Problem     Lesion on (L) foot       Pacemaker or defibrillator? No    Artificial Joints or Valves: No    Taking Aspirin or blood thinner? Yes     Personal history of skin cancer:No    Family history of skin cancer: No    History of sunburns/tanning bed use:  sunburns as a child/teenager and sunburns occasionally as an adult    Sunscreen use: never    Pharmacy is set up and verified.    Patient would like communication of their results via:      Cell Phone:   Telephone Information:   Mobile [phone]     Okay to leave a message containing results? Yes    No known Latex allergy or symptoms of Latex sensitivity.    Medications, allergies, and tobacco use verified  No vitals needed per      No

## 2022-06-25 NOTE — ED ADULT NURSE REASSESSMENT NOTE - NS ED NURSE REASSESS COMMENT FT1
Resumed care from HUMBERTO Breaux. Patient alert and oriented, complaining of pain at this time. Will medicate per orders. Safety and comfort measures maintained. Call bell in reach.

## 2022-06-25 NOTE — ED ADULT NURSE NOTE - NS PRO AD ANY ON CHART
History of appendectomy    History of cholecystectomy    History of hip surgery  Right Hip Replacement   Yes

## 2022-06-25 NOTE — H&P ADULT - ASSESSMENT
31 F POD 10 s/p laparoscopic RNY gastric bypass now presenting wt acute onset LUQ abdominal pain sharp radiating to the right side that awoke her from sleep at 2 am and is not improving with meds prescribed at home. Passing flatus. States that she was improving up until now. Has been following post surgical instructions. No dysuria, hematuria, nausea. Passing flatus. CT scan showed pneumoperitoneum.      Plan:    - Admit to Dr. Cordero .  - NPO.  - IVF.  - Pain control.  - Serial abdominal exam.  - IF the abdominal exam get worse will go for diagnostic laparoscopy.  - Will repeat the scan in 24-48 hours .    Plan was discussed with Dr. Cordero

## 2022-06-25 NOTE — ED PROVIDER NOTE - PHYSICAL EXAMINATION
Attending MD Sabillon :   PHYSICAL EXAM:    GENERAL: NAD  HEENT:  Atraumatic  CHEST/LUNG: Chest rise equal bilaterally  HEART: Tachycardic  ABDOMEN: Generalized abdominal TTP, surgical incisions CDI.   EXTREMITIES:  Extremities warm  PSYCH: A&Ox3  SKIN: No obvious rashes or lesions. Rectal temp without fever.

## 2022-06-25 NOTE — ED ADULT NURSE NOTE - NS ED NURSE RECORD ANOTHER VITAL SIGN
Reason For Visit  MARITZA CHARLES is here today for a nurse visit for TB reading.      Assessment   1. Encounter for PPD skin test reading (Z11.1)    Plan    Patient Instructions PPD reading performed on 09/27/18 at 2:36pm  0mm induration (negative) noted on the left forearm, pt made aware of the results.        Signatures   Electronically signed by : SCOTT CHATMAN; Sep 27 2018  2:36PM CST (Author)    
Reason For Visit  MARITZA CHARLES is here today for a nurse visit for TB reading.      Assessment   1. Encounter for PPD skin test reading (Z11.1)    Signatures   Electronically signed by : SCOTT CHATMAN; Sep 27 2018  2:37PM CST (Author)    
Yes

## 2022-06-26 ENCOUNTER — TRANSCRIPTION ENCOUNTER (OUTPATIENT)
Age: 32
End: 2022-06-26

## 2022-06-26 VITALS
RESPIRATION RATE: 18 BRPM | HEART RATE: 65 BPM | OXYGEN SATURATION: 99 % | SYSTOLIC BLOOD PRESSURE: 113 MMHG | DIASTOLIC BLOOD PRESSURE: 62 MMHG | TEMPERATURE: 98 F

## 2022-06-26 LAB
ANION GAP SERPL CALC-SCNC: 9 MMOL/L — SIGNIFICANT CHANGE UP (ref 5–17)
BUN SERPL-MCNC: 6 MG/DL — LOW (ref 7–23)
CALCIUM SERPL-MCNC: 8.7 MG/DL — SIGNIFICANT CHANGE UP (ref 8.5–10.1)
CHLORIDE SERPL-SCNC: 109 MMOL/L — HIGH (ref 96–108)
CO2 SERPL-SCNC: 23 MMOL/L — SIGNIFICANT CHANGE UP (ref 22–31)
CREAT SERPL-MCNC: 0.44 MG/DL — LOW (ref 0.5–1.3)
EGFR: 131 ML/MIN/1.73M2 — SIGNIFICANT CHANGE UP
GLUCOSE SERPL-MCNC: 83 MG/DL — SIGNIFICANT CHANGE UP (ref 70–99)
HCT VFR BLD CALC: 33.7 % — LOW (ref 34.5–45)
HGB BLD-MCNC: 10.6 G/DL — LOW (ref 11.5–15.5)
MAGNESIUM SERPL-MCNC: 1.8 MG/DL — SIGNIFICANT CHANGE UP (ref 1.6–2.6)
MCHC RBC-ENTMCNC: 30.5 PG — SIGNIFICANT CHANGE UP (ref 27–34)
MCHC RBC-ENTMCNC: 31.5 GM/DL — LOW (ref 32–36)
MCV RBC AUTO: 97.1 FL — SIGNIFICANT CHANGE UP (ref 80–100)
PHOSPHATE SERPL-MCNC: 2.8 MG/DL — SIGNIFICANT CHANGE UP (ref 2.5–4.5)
PLATELET # BLD AUTO: 266 K/UL — SIGNIFICANT CHANGE UP (ref 150–400)
POTASSIUM SERPL-MCNC: 3.6 MMOL/L — SIGNIFICANT CHANGE UP (ref 3.5–5.3)
POTASSIUM SERPL-SCNC: 3.6 MMOL/L — SIGNIFICANT CHANGE UP (ref 3.5–5.3)
RBC # BLD: 3.47 M/UL — LOW (ref 3.8–5.2)
RBC # FLD: 14 % — SIGNIFICANT CHANGE UP (ref 10.3–14.5)
SODIUM SERPL-SCNC: 141 MMOL/L — SIGNIFICANT CHANGE UP (ref 135–145)
WBC # BLD: 5.22 K/UL — SIGNIFICANT CHANGE UP (ref 3.8–10.5)
WBC # FLD AUTO: 5.22 K/UL — SIGNIFICANT CHANGE UP (ref 3.8–10.5)

## 2022-06-26 PROCEDURE — 74176 CT ABD & PELVIS W/O CONTRAST: CPT | Mod: 26

## 2022-06-26 RX ORDER — ACETAMINOPHEN 500 MG
1000 TABLET ORAL ONCE
Refills: 0 | Status: COMPLETED | OUTPATIENT
Start: 2022-06-26 | End: 2022-06-26

## 2022-06-26 RX ADMIN — SODIUM CHLORIDE 150 MILLILITER(S): 9 INJECTION, SOLUTION INTRAVENOUS at 06:18

## 2022-06-26 RX ADMIN — Medication 15 MILLIGRAM(S): at 12:32

## 2022-06-26 RX ADMIN — PANTOPRAZOLE SODIUM 40 MILLIGRAM(S): 20 TABLET, DELAYED RELEASE ORAL at 09:33

## 2022-06-26 RX ADMIN — Medication 15 MILLIGRAM(S): at 12:47

## 2022-06-26 RX ADMIN — Medication 400 MILLIGRAM(S): at 19:53

## 2022-06-26 RX ADMIN — SODIUM CHLORIDE 150 MILLILITER(S): 9 INJECTION, SOLUTION INTRAVENOUS at 00:19

## 2022-06-26 RX ADMIN — SODIUM CHLORIDE 150 MILLILITER(S): 9 INJECTION, SOLUTION INTRAVENOUS at 19:37

## 2022-06-26 RX ADMIN — ENOXAPARIN SODIUM 40 MILLIGRAM(S): 100 INJECTION SUBCUTANEOUS at 09:32

## 2022-06-26 NOTE — DISCHARGE NOTE PROVIDER - CARE PROVIDER_API CALL
Sukhwinder Cordero)  Surgery  [address], [address]  [place] [address]  Phone: [phone]  Fax: [phone]  Follow Up Time: 2 weeks

## 2022-06-26 NOTE — DISCHARGE NOTE PROVIDER - NSDCFUADDINST_GEN_ALL_CORE_FT
- Please continue on bariatric Liquid.  - No heavy lifting more than 10 Ib more than 8 weeks.   - Please come for follow up with Dr. Cordero in his office after weeks.

## 2022-06-26 NOTE — PROGRESS NOTE ADULT - SUBJECTIVE AND OBJECTIVE BOX
The patient is doing well without complaints.  The pain has improved    Vital Signs Last 24 Hrs  T(C): 36.6 (26 Jun 2022 06:47), Max: 37.1 (25 Jun 2022 10:42)  T(F): 97.8 (26 Jun 2022 06:47), Max: 98.8 (25 Jun 2022 10:42)  HR: 80 (26 Jun 2022 06:47) (70 - 100)  BP: 104/62 (26 Jun 2022 06:47) (90/58 - 107/59)  BP(mean): 67 (25 Jun 2022 10:42) (67 - 67)  RR: 16 (26 Jun 2022 06:47) (16 - 18)  SpO2: 99% (26 Jun 2022 06:47) (98% - 100%)    PHYSICAL EXAM:  General: NAD.  HEENT: no JVD, no jaundice.  LUNGS: CTAB.  Heart: S1 S2 RRR  Abd: soft nt/nd   Wounds: clean dry and intact                          10.6   5.22  )-----------( 266      ( 26 Jun 2022 07:39 )             33.7       06-26    141  |  109<H>  |  6<L>  ----------------------------<  83  3.6   |  23  |  0.44<L>    Ca    8.7      26 Jun 2022 07:39  Phos  2.8     06-26  Mg     1.8     06-26    TPro  8.5<H>  /  Alb  3.8  /  TBili  0.4  /  DBili  x   /  AST  58<H>  /  ALT  121<H>  /  AlkPhos  108  06-25

## 2022-06-26 NOTE — DISCHARGE NOTE PROVIDER - HOSPITAL COURSE
Patient was admitted for abdominal pain s/p laparoscopic gastric bypass 11 days ago. CT scan of abdomen showed pneumoperitoneum . Patient was followed up for 1 days , repeat CT scan showed improved size of pneumoperitoneum . Patient tolerated CLD. No acute events were reported from nursing staff.

## 2022-06-26 NOTE — DISCHARGE NOTE PROVIDER - NSDCMRMEDTOKEN_GEN_ALL_CORE_FT
Bariatric Fusion Mixed Berry Complete oral tablet, chewable: 1 tab(s) orally once a day  Fioricet oral tablet: 1 tab(s) orally every 4 hours, As Needed  pantoprazole 40 mg oral delayed release tablet: 1 tab(s) orally once a day  traMADol 50 mg oral tablet: 1 tab(s) orally every 6 hours, As Needed  Tylenol 8 Hour 650 mg oral tablet, extended release: 1 tab(s) orally every 8 hours, As Needed  Xanax 0.5 mg oral tablet: 1 tab(s) orally once a day, As Needed

## 2022-06-26 NOTE — PROGRESS NOTE ADULT - ASSESSMENT
free intraperitoneal air on POD10 without anyother signs of leak or abdominal perforation  NO WBC, Afebrile. Pain improving  will repeat the ct scan with oral contrast  continue NPO  continue to monitor for now.

## 2022-06-26 NOTE — DISCHARGE NOTE PROVIDER - NSDCFUSCHEDAPPT_GEN_ALL_CORE_FT
Thomas Sanford  [facility] Physician [facility]  CARDIOLOGY [address]  Scheduled Appointment: 09/19/2022

## 2022-06-27 ENCOUNTER — NON-APPOINTMENT (OUTPATIENT)
Age: 32
End: 2022-06-27

## 2022-06-27 LAB
CULTURE RESULTS: SIGNIFICANT CHANGE UP
SPECIMEN SOURCE: SIGNIFICANT CHANGE UP

## 2022-07-01 DIAGNOSIS — K21.9 GASTRO-ESOPHAGEAL REFLUX DISEASE WITHOUT ESOPHAGITIS: ICD-10-CM

## 2022-07-01 DIAGNOSIS — G93.0 CEREBRAL CYSTS: ICD-10-CM

## 2022-07-01 DIAGNOSIS — G89.18 OTHER ACUTE POSTPROCEDURAL PAIN: ICD-10-CM

## 2022-07-01 DIAGNOSIS — Z86.16 PERSONAL HISTORY OF COVID-19: ICD-10-CM

## 2022-07-01 DIAGNOSIS — Z90.49 ACQUIRED ABSENCE OF OTHER SPECIFIED PARTS OF DIGESTIVE TRACT: ICD-10-CM
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
## 2022-07-01 DIAGNOSIS — K95.89 OTHER COMPLICATIONS OF OTHER BARIATRIC PROCEDURE: ICD-10-CM

## 2022-07-01 DIAGNOSIS — F41.9 ANXIETY DISORDER, UNSPECIFIED: ICD-10-CM

## 2022-07-01 DIAGNOSIS — Z98.84 BARIATRIC SURGERY STATUS: ICD-10-CM

## 2022-07-01 DIAGNOSIS — K58.9 IRRITABLE BOWEL SYNDROME WITHOUT DIARRHEA: ICD-10-CM

## 2022-07-01 DIAGNOSIS — K44.9 DIAPHRAGMATIC HERNIA WITHOUT OBSTRUCTION OR GANGRENE: ICD-10-CM

## 2022-07-01 DIAGNOSIS — G43.909 MIGRAINE, UNSPECIFIED, NOT INTRACTABLE, WITHOUT STATUS MIGRAINOSUS: ICD-10-CM

## 2022-07-01 DIAGNOSIS — E66.01 MORBID (SEVERE) OBESITY DUE TO EXCESS CALORIES: ICD-10-CM

## 2022-07-01 DIAGNOSIS — K66.8 OTHER SPECIFIED DISORDERS OF PERITONEUM: ICD-10-CM

## 2022-07-07 ENCOUNTER — EMERGENCY (EMERGENCY)
Facility: HOSPITAL | Age: 32
LOS: 0 days | Discharge: HOME | End: 2022-07-07
Attending: EMERGENCY MEDICINE
Payer: COMMERCIAL

## 2022-07-07 VITALS — HEIGHT: 62 IN | WEIGHT: 195.11 LBS

## 2022-07-07 VITALS
DIASTOLIC BLOOD PRESSURE: 56 MMHG | TEMPERATURE: 98 F | OXYGEN SATURATION: 100 % | SYSTOLIC BLOOD PRESSURE: 94 MMHG | HEART RATE: 75 BPM | RESPIRATION RATE: 16 BRPM

## 2022-07-07 DIAGNOSIS — K44.9 DIAPHRAGMATIC HERNIA WITHOUT OBSTRUCTION OR GANGRENE: ICD-10-CM

## 2022-07-07 DIAGNOSIS — E86.0 DEHYDRATION: ICD-10-CM

## 2022-07-07 DIAGNOSIS — R06.02 SHORTNESS OF BREATH: ICD-10-CM

## 2022-07-07 DIAGNOSIS — Z90.49 ACQUIRED ABSENCE OF OTHER SPECIFIED PARTS OF DIGESTIVE TRACT: Chronic | ICD-10-CM

## 2022-07-07 DIAGNOSIS — R19.7 DIARRHEA, UNSPECIFIED: ICD-10-CM

## 2022-07-07 DIAGNOSIS — Z98.84 BARIATRIC SURGERY STATUS: ICD-10-CM

## 2022-07-07 DIAGNOSIS — R00.0 TACHYCARDIA, UNSPECIFIED: ICD-10-CM

## 2022-07-07 DIAGNOSIS — G43.909 MIGRAINE, UNSPECIFIED, NOT INTRACTABLE, WITHOUT STATUS MIGRAINOSUS: ICD-10-CM

## 2022-07-07 DIAGNOSIS — F41.9 ANXIETY DISORDER, UNSPECIFIED: ICD-10-CM

## 2022-07-07 DIAGNOSIS — K58.9 IRRITABLE BOWEL SYNDROME WITHOUT DIARRHEA: ICD-10-CM

## 2022-07-07 DIAGNOSIS — K21.9 GASTRO-ESOPHAGEAL REFLUX DISEASE WITHOUT ESOPHAGITIS: ICD-10-CM

## 2022-07-07 DIAGNOSIS — Z90.49 ACQUIRED ABSENCE OF OTHER SPECIFIED PARTS OF DIGESTIVE TRACT: ICD-10-CM

## 2022-07-07 DIAGNOSIS — E28.2 POLYCYSTIC OVARIAN SYNDROME: ICD-10-CM

## 2022-07-07 DIAGNOSIS — Z86.16 PERSONAL HISTORY OF COVID-19: ICD-10-CM

## 2022-07-07 DIAGNOSIS — R42 DIZZINESS AND GIDDINESS: ICD-10-CM

## 2022-07-07 LAB
ALBUMIN SERPL ELPH-MCNC: 3.4 G/DL — SIGNIFICANT CHANGE UP (ref 3.3–5)
ALBUMIN SERPL ELPH-MCNC: 4.4 G/DL
ALP BLD-CCNC: 73 U/L
ALP SERPL-CCNC: 64 U/L — SIGNIFICANT CHANGE UP (ref 40–120)
ALT FLD-CCNC: 46 U/L — SIGNIFICANT CHANGE UP (ref 12–78)
ALT SERPL-CCNC: 33 U/L
ANION GAP SERPL CALC-SCNC: 10 MMOL/L — SIGNIFICANT CHANGE UP (ref 5–17)
ANION GAP SERPL CALC-SCNC: 18 MMOL/L
APTT BLD: 35 SEC — SIGNIFICANT CHANGE UP (ref 27.5–35.5)
AST SERPL-CCNC: 22 U/L
AST SERPL-CCNC: 26 U/L — SIGNIFICANT CHANGE UP (ref 15–37)
BASOPHILS # BLD AUTO: 0.03 K/UL — SIGNIFICANT CHANGE UP (ref 0–0.2)
BASOPHILS # BLD AUTO: 0.04 K/UL
BASOPHILS NFR BLD AUTO: 0.6 % — SIGNIFICANT CHANGE UP (ref 0–2)
BASOPHILS NFR BLD AUTO: 0.7 %
BILIRUB SERPL-MCNC: 0.2 MG/DL
BILIRUB SERPL-MCNC: 0.4 MG/DL — SIGNIFICANT CHANGE UP (ref 0.2–1.2)
BUN SERPL-MCNC: 16 MG/DL
BUN SERPL-MCNC: 6 MG/DL — LOW (ref 7–23)
CALCIUM SERPL-MCNC: 9 MG/DL — SIGNIFICANT CHANGE UP (ref 8.5–10.1)
CALCIUM SERPL-MCNC: 9.2 MG/DL
CHLORIDE SERPL-SCNC: 103 MMOL/L — SIGNIFICANT CHANGE UP (ref 96–108)
CHLORIDE SERPL-SCNC: 99 MMOL/L
CO2 SERPL-SCNC: 21 MMOL/L
CO2 SERPL-SCNC: 23 MMOL/L — SIGNIFICANT CHANGE UP (ref 22–31)
CREAT SERPL-MCNC: 0.56 MG/DL
CREAT SERPL-MCNC: 0.58 MG/DL — SIGNIFICANT CHANGE UP (ref 0.5–1.3)
D DIMER BLD IA.RAPID-MCNC: 280 NG/ML DDU — HIGH
EGFR: 122 ML/MIN/1.73M2 — SIGNIFICANT CHANGE UP
EGFR: 124 ML/MIN/1.73M2
EOSINOPHIL # BLD AUTO: 0.12 K/UL
EOSINOPHIL # BLD AUTO: 0.2 K/UL — SIGNIFICANT CHANGE UP (ref 0–0.5)
EOSINOPHIL NFR BLD AUTO: 2.1 %
EOSINOPHIL NFR BLD AUTO: 3.8 % — SIGNIFICANT CHANGE UP (ref 0–6)
GLUCOSE SERPL-MCNC: 81 MG/DL — SIGNIFICANT CHANGE UP (ref 70–99)
GLUCOSE SERPL-MCNC: 87 MG/DL
HCG SERPL-ACNC: <1 MIU/ML — SIGNIFICANT CHANGE UP
HCT VFR BLD CALC: 36.1 % — SIGNIFICANT CHANGE UP (ref 34.5–45)
HCT VFR BLD CALC: 39 %
HGB BLD-MCNC: 11.5 G/DL — SIGNIFICANT CHANGE UP (ref 11.5–15.5)
HGB BLD-MCNC: 12.1 G/DL
IMM GRANULOCYTES NFR BLD AUTO: 0.2 %
IMM GRANULOCYTES NFR BLD AUTO: 0.2 % — SIGNIFICANT CHANGE UP (ref 0–1.5)
INR BLD: 1.22 RATIO — HIGH (ref 0.88–1.16)
LYMPHOCYTES # BLD AUTO: 1.66 K/UL — SIGNIFICANT CHANGE UP (ref 1–3.3)
LYMPHOCYTES # BLD AUTO: 1.73 K/UL
LYMPHOCYTES # BLD AUTO: 31.3 % — SIGNIFICANT CHANGE UP (ref 13–44)
LYMPHOCYTES NFR BLD AUTO: 30.3 %
MAGNESIUM SERPL-MCNC: 1.8 MG/DL
MAGNESIUM SERPL-MCNC: 1.9 MG/DL — SIGNIFICANT CHANGE UP (ref 1.6–2.6)
MAN DIFF?: NORMAL
MCHC RBC-ENTMCNC: 30.1 PG
MCHC RBC-ENTMCNC: 30.7 PG — SIGNIFICANT CHANGE UP (ref 27–34)
MCHC RBC-ENTMCNC: 31 GM/DL
MCHC RBC-ENTMCNC: 31.9 GM/DL — LOW (ref 32–36)
MCV RBC AUTO: 96.5 FL — SIGNIFICANT CHANGE UP (ref 80–100)
MCV RBC AUTO: 97 FL
MONOCYTES # BLD AUTO: 0.5 K/UL
MONOCYTES # BLD AUTO: 0.56 K/UL — SIGNIFICANT CHANGE UP (ref 0–0.9)
MONOCYTES NFR BLD AUTO: 10.6 % — SIGNIFICANT CHANGE UP (ref 2–14)
MONOCYTES NFR BLD AUTO: 8.8 %
NEUTROPHILS # BLD AUTO: 2.84 K/UL — SIGNIFICANT CHANGE UP (ref 1.8–7.4)
NEUTROPHILS # BLD AUTO: 3.31 K/UL
NEUTROPHILS NFR BLD AUTO: 53.5 % — SIGNIFICANT CHANGE UP (ref 43–77)
NEUTROPHILS NFR BLD AUTO: 57.9 %
PLATELET # BLD AUTO: 287 K/UL — SIGNIFICANT CHANGE UP (ref 150–400)
PLATELET # BLD AUTO: 324 K/UL
POTASSIUM SERPL-MCNC: 3.2 MMOL/L — LOW (ref 3.5–5.3)
POTASSIUM SERPL-SCNC: 3.2 MMOL/L — LOW (ref 3.5–5.3)
POTASSIUM SERPL-SCNC: 3.9 MMOL/L
PROT SERPL-MCNC: 7.1 G/DL
PROT SERPL-MCNC: 7.3 GM/DL — SIGNIFICANT CHANGE UP (ref 6–8.3)
PROTHROM AB SERPL-ACNC: 14.2 SEC — HIGH (ref 10.5–13.4)
RBC # BLD: 3.74 M/UL — LOW (ref 3.8–5.2)
RBC # BLD: 4.02 M/UL
RBC # FLD: 14.3 % — SIGNIFICANT CHANGE UP (ref 10.3–14.5)
RBC # FLD: 14.6 %
SODIUM SERPL-SCNC: 136 MMOL/L — SIGNIFICANT CHANGE UP (ref 135–145)
SODIUM SERPL-SCNC: 139 MMOL/L
T4 FREE SERPL-MCNC: 1.4 NG/DL
TSH SERPL-ACNC: 2.45 UIU/ML
WBC # BLD: 5.3 K/UL — SIGNIFICANT CHANGE UP (ref 3.8–10.5)
WBC # FLD AUTO: 5.3 K/UL — SIGNIFICANT CHANGE UP (ref 3.8–10.5)
WBC # FLD AUTO: 5.71 K/UL

## 2022-07-07 PROCEDURE — 93005 ELECTROCARDIOGRAM TRACING: CPT

## 2022-07-07 PROCEDURE — 80053 COMPREHEN METABOLIC PANEL: CPT

## 2022-07-07 PROCEDURE — 99285 EMERGENCY DEPT VISIT HI MDM: CPT

## 2022-07-07 PROCEDURE — 85610 PROTHROMBIN TIME: CPT

## 2022-07-07 PROCEDURE — 85025 COMPLETE CBC W/AUTO DIFF WBC: CPT

## 2022-07-07 PROCEDURE — 85379 FIBRIN DEGRADATION QUANT: CPT

## 2022-07-07 PROCEDURE — 36415 COLL VENOUS BLD VENIPUNCTURE: CPT

## 2022-07-07 PROCEDURE — 93010 ELECTROCARDIOGRAM REPORT: CPT

## 2022-07-07 PROCEDURE — 84702 CHORIONIC GONADOTROPIN TEST: CPT

## 2022-07-07 PROCEDURE — 99283 EMERGENCY DEPT VISIT LOW MDM: CPT

## 2022-07-07 PROCEDURE — 83735 ASSAY OF MAGNESIUM: CPT

## 2022-07-07 PROCEDURE — 85730 THROMBOPLASTIN TIME PARTIAL: CPT

## 2022-07-07 RX ORDER — SODIUM CHLORIDE 9 MG/ML
2700 INJECTION INTRAMUSCULAR; INTRAVENOUS; SUBCUTANEOUS ONCE
Refills: 0 | Status: COMPLETED | OUTPATIENT
Start: 2022-07-07 | End: 2022-07-07

## 2022-07-07 RX ADMIN — SODIUM CHLORIDE 2700 MILLILITER(S): 9 INJECTION INTRAMUSCULAR; INTRAVENOUS; SUBCUTANEOUS at 18:21

## 2022-07-07 NOTE — ED STATDOCS - OBJECTIVE STATEMENT
30 y/o female s/p laparoscopic gastric bypass 3 weeks ago by Dr. Cordero presents to the ED with diarrhea x3 days. Pt notes associated dizziness and SOB. Pt states her heart rate has also been increasing since the surgery. Pt called Gil's office and was referred to the ED for evaluation of dehydration. No LE edema.  No abdominal pain, no chest pain, no SOB.

## 2022-07-07 NOTE — ED STATDOCS - ATTENDING APP SHARED VISIT CONTRIBUTION OF CARE
I, Sukhwinder Cornell MD, personally saw the patient with ACP.  I have personally performed a face to face diagnostic evaluation on this patient.  I have reviewed the ACP note and agree with the history, exam, and plan of care, except as noted.   The initial assessment was performed by myself and then the patient was handed off to the ACP. The patient was followed and re-evaluated by the ACP. All labs, imaging and procedures were evaluated and performed by the ACP and I was available for consultation if any questions in the patients care came up.

## 2022-07-07 NOTE — ED ADULT TRIAGE NOTE - CHIEF COMPLAINT QUOTE
Pt comes to the ED complaining of dehydration. Pt states that she had gastric bypass 3 weeks ago with Dr. Cordero and that a few days ago she developed diarrhea and had 15-20 episodes/day. pt states that diarrhea is not as severe today. Pt complains of intermittent dizziness and states that when she stands up her heart rate increased to 140 and then when she sits down it returns to .

## 2022-07-07 NOTE — ED STATDOCS - PROGRESS NOTE DETAILS
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 SInce the surgery she was having symptoms and states the tachycardia only occurred while standing up. Will check labs, cxr, and reeval. -Scott Alejandra PA-C Discussed results with pt. Informed her of the slightly elevated DD. Her HR improved with fluids and pt feels better. Unlikely for it to be PE related. Discussed the options and offered CTA chest; however, pt refused a CTA and understands to come in for worsening symptoms. Spoke wioth Dr. Cordero who is aware and ok with pt being d/c home. WIll wait for pt to finish her IVF> -Scott Alejandra PA-C

## 2022-07-07 NOTE — ED STATDOCS - NS ED ROS FT
Constitutional: No fever or chills  Eyes: No visual changes  HEENT: No throat pain  CV: No chest pain, +tachycardia   Resp: No SOB no cough  GI: No abd pain, nausea or vomiting, +dizziness   : No dysuria  MSK: No musculoskeletal pain  Skin: No rash  Neuro: No headache

## 2022-07-07 NOTE — ED STATDOCS - PATIENT PORTAL LINK FT
You can access the FollowMyHealth Patient Portal offered by [facility] by registering at the following website: [email or web address]. By joining [facility]’s FollowMyHealth portal, you will also be able to view your health information using other applications (apps) compatible with our system.

## 2022-07-07 NOTE — ED STATDOCS - NS_ ATTENDINGSCRIBEDETAILS _ED_A_ED_FT
I, Sukhwinder Cornell MD,  performed the initial face to face bedside interview with this patient regarding history of present illness, review of symptoms and relevant past medical, social and family history.  I completed an independent physical examination.  I was the initial provider who evaluated this patient.   I personally saw the patient and performed a substantive portion of the visit including all aspects of the medical decision making.  The history, relevant review of systems, past medical and surgical history, medical decision making, and physical examination was documented by the scribe in my presence and I attest to the accuracy of the documentation.

## 2022-07-07 NOTE — ED STATDOCS - CLINICAL SUMMARY MEDICAL DECISION MAKING FREE TEXT BOX
31F presents to the Ed for dehydration, elevated HR, mild SOB in the recent gastric bypass, symptoms likely given recent surgery with multiples episodes of diarrhea, orthostasis and decreased urine output. Low suspicion for PE given will check labs, give fluids, consult Dr. Cordero, reassess. 31F presents to the Ed for dehydration, elevated HR, mild SOB in the recent gastric bypass, symptoms likely given recent surgery with multiples episodes of diarrhea, orthostasis and decreased urine output. Low suspicion for PE given hx will check labs, give fluids, consult Dr. Cordero, reassess.

## 2022-07-09 LAB
CALCIUM SERPL-MCNC: 9.2 MG/DL
CORTIS SERPL-MCNC: 11.6 UG/DL
FERRITIN SERPL-MCNC: 114 NG/ML
FOLATE SERPL-MCNC: >20 NG/ML
IRON SATN MFR SERPL: 17 %
IRON SERPL-MCNC: 45 UG/DL
PARATHYROID HORMONE INTACT: 42 PG/ML
PHOSPHATE SERPL-MCNC: 2.5 MG/DL
TIBC SERPL-MCNC: 261 UG/DL
UIBC SERPL-MCNC: 216 UG/DL
VIT B12 SERPL-MCNC: >2000 PG/ML

## 2022-07-12 ENCOUNTER — APPOINTMENT (OUTPATIENT)
Dept: CARDIOLOGY | Facility: CLINIC | Age: 32
End: 2022-07-12

## 2022-07-12 ENCOUNTER — NON-APPOINTMENT (OUTPATIENT)
Age: 32
End: 2022-07-12

## 2022-07-12 VITALS
BODY MASS INDEX: 36.07 KG/M2 | WEIGHT: 196 LBS | SYSTOLIC BLOOD PRESSURE: 91 MMHG | DIASTOLIC BLOOD PRESSURE: 66 MMHG | HEART RATE: 80 BPM | RESPIRATION RATE: 16 BRPM | OXYGEN SATURATION: 98 % | HEIGHT: 62 IN

## 2022-07-12 DIAGNOSIS — R00.2 PALPITATIONS: ICD-10-CM

## 2022-07-12 DIAGNOSIS — R00.0 TACHYCARDIA, UNSPECIFIED: ICD-10-CM

## 2022-07-12 DIAGNOSIS — R06.00 DYSPNEA, UNSPECIFIED: ICD-10-CM

## 2022-07-12 PROCEDURE — 99214 OFFICE O/P EST MOD 30 MIN: CPT

## 2022-07-12 PROCEDURE — 93000 ELECTROCARDIOGRAM COMPLETE: CPT

## 2022-07-12 RX ORDER — TRAMADOL HYDROCHLORIDE 50 MG/1
50 TABLET, COATED ORAL
Qty: 56 | Refills: 0 | Status: DISCONTINUED | COMMUNITY
Start: 2022-06-19 | End: 2022-07-12

## 2022-07-12 RX ORDER — MULTIVITAMIN
TABLET ORAL
Refills: 0 | Status: ACTIVE | COMMUNITY

## 2022-07-12 RX ORDER — PANTOPRAZOLE 40 MG/1
40 TABLET, DELAYED RELEASE ORAL DAILY
Qty: 90 | Refills: 3 | Status: ACTIVE | COMMUNITY

## 2022-07-12 NOTE — DISCUSSION/SUMMARY
[FreeTextEntry1] : 1.  Check exercise stress test to evaluate palpitations and chest pain she is having with exertion.\par 2.  Check Holter monitor to further evaluate her palpitations as well.\par 3.  No additional cardiac medications at this time.\par 4.  Strongly encouraged the patient to hydrate is much as possible within limits of her surgery.\par 5.  Follow-up with her bariatric surgeon.\par 6.  Follow up here in one month. [EKG obtained to assist in diagnosis and management of assessed problem(s)] : EKG obtained to assist in diagnosis and management of assessed problem(s)

## 2022-07-12 NOTE — HISTORY OF PRESENT ILLNESS
[FreeTextEntry1] : Patient presents back to the office today having successfully had a bypass surgery without any real incident but having a lot of symptoms since the surgery was done.  She has been having a lot of issues with dizziness and feeling very weak.  She feels that whenever she gets up and does any significant exertion she gets very dizzy and lightheaded.  She has been out of the hospital over the last few weeks with the symptoms as well as some intermittent chest discomfort.  This also seems to occur at times when she exerts herself.  She did have a CTA which ruled out PE a couple of weeks ago.  This morning after she got the shower and as she was changing in position she started feeling her heart racing and dizziness and this made her more concerned.  She does note that she had some diarrhea last week and was in the hospital for hydration but since then has been constipated.  Patient denies shortness of breath, orthopnea, presyncope, syncope.

## 2022-07-12 NOTE — ASSESSMENT
[FreeTextEntry1] : Echocardiogram April 18, 2022 demonstrates left ventricle normal size and function with ejection fraction of 55 to 60%.  Trace mitral, mild aortic and mild pulmonic regurgitation noted.\par \par Exercise stress test May 18, 2022 during which the patient exercised via a Allen protocol for 4 minutes and 55 seconds, totaling 6 METS.  Peak heart rate achieved was 179 bpm, representing 96% of age-predicted maximum.  Blood pressure response to exercise was normal.  EKG showed no evidence of ischemia with exercise.\par \par EKG: Sinus rhythm with no significant ST or T wave changes.  Early repolarization noted.\par \par 31-year-old female with a past medical history of morbid obesity now status post bariatric surgery presents to me for symptoms following her surgery.  The symptoms patient is having are likely all secondary to her surgery and a degree of volume depletion.  I encouraged her to remain as well-hydrated as possible within the limits of her surgery.  Also other behavioral changes such as getting up slowly and not standing in place will be helpful.  Likely her body will adjust to the changes and eventually the symptoms will resolve on their own.  The palpitations are likely all secondary to her being dehydrated and possibly having some low blood pressure which is noted here today.  I will do a Holter monitor to rule out any significant arrhythmia.

## 2022-07-12 NOTE — PHYSICAL EXAM
[Well Developed] : well developed [Well Nourished] : well nourished [No Acute Distress] : no acute distress [Normal Conjunctiva] : normal conjunctiva [Normal Venous Pressure] : normal venous pressure [No Carotid Bruit] : no carotid bruit [Normal S1, S2] : normal S1, S2 [No Rub] : no rub [No Gallop] : no gallop [Clear Lung Fields] : clear lung fields [Good Air Entry] : good air entry [No Respiratory Distress] : no respiratory distress  [Soft] : abdomen soft [Non Tender] : non-tender [No Masses/organomegaly] : no masses/organomegaly [Normal Bowel Sounds] : normal bowel sounds [Normal Gait] : normal gait [No Edema] : no edema [No Cyanosis] : no cyanosis [No Clubbing] : no clubbing [No Varicosities] : no varicosities [Moves all extremities] : moves all extremities [No Focal Deficits] : no focal deficits [Normal Speech] : normal speech [Alert and Oriented] : alert and oriented [Normal memory] : normal memory [de-identified] : 2/6 systolic murmur at left lower sternal border

## 2022-07-14 LAB — VIT B1 SERPL-MCNC: 98.9 NMOL/L

## 2022-07-16 DIAGNOSIS — N76.0 ACUTE VAGINITIS: ICD-10-CM

## 2022-07-16 RX ORDER — FLUCONAZOLE 150 MG/1
150 TABLET ORAL
Qty: 2 | Refills: 0 | Status: ACTIVE | COMMUNITY
Start: 2022-07-16 | End: 1900-01-01

## 2022-08-24 DIAGNOSIS — R05.9 COUGH, UNSPECIFIED: ICD-10-CM

## 2022-08-24 LAB
RAPID RVP RESULT: DETECTED
RV+EV RNA SPEC QL NAA+PROBE: DETECTED
SARS-COV-2 RNA PNL RESP NAA+PROBE: NOT DETECTED

## 2022-10-13 LAB — SARS-COV-2 N GENE NPH QL NAA+PROBE: NOT DETECTED

## 2022-10-16 DIAGNOSIS — B34.9 VIRAL INFECTION, UNSPECIFIED: ICD-10-CM

## 2022-10-29 LAB
25(OH)D3 SERPL-MCNC: 54.3 NG/ML
ALBUMIN SERPL ELPH-MCNC: 4 G/DL
ALP BLD-CCNC: 74 U/L
ALT SERPL-CCNC: 16 U/L
ANION GAP SERPL CALC-SCNC: 11 MMOL/L
AST SERPL-CCNC: 17 U/L
BASOPHILS # BLD AUTO: 0.04 K/UL
BASOPHILS NFR BLD AUTO: 0.8 %
BILIRUB SERPL-MCNC: 0.3 MG/DL
BUN SERPL-MCNC: 10 MG/DL
CALCIUM SERPL-MCNC: 9.7 MG/DL
CHLORIDE SERPL-SCNC: 103 MMOL/L
CHOLEST SERPL-MCNC: 134 MG/DL
CO2 SERPL-SCNC: 24 MMOL/L
CREAT SERPL-MCNC: 0.58 MG/DL
EGFR: 122 ML/MIN/1.73M2
EOSINOPHIL # BLD AUTO: 0.12 K/UL
EOSINOPHIL NFR BLD AUTO: 2.5 %
ESTIMATED AVERAGE GLUCOSE: 103 MG/DL
FOLATE SERPL-MCNC: >20 NG/ML
GLUCOSE SERPL-MCNC: 90 MG/DL
HBA1C MFR BLD HPLC: 5.2 %
HCT VFR BLD CALC: 37.2 %
HDLC SERPL-MCNC: 40 MG/DL
HGB BLD-MCNC: 11.5 G/DL
IMM GRANULOCYTES NFR BLD AUTO: 0.2 %
IRON SATN MFR SERPL: 19 %
IRON SERPL-MCNC: 59 UG/DL
LDLC SERPL CALC-MCNC: 76 MG/DL
LYMPHOCYTES # BLD AUTO: 1.5 K/UL
LYMPHOCYTES NFR BLD AUTO: 30.9 %
MAN DIFF?: NORMAL
MCHC RBC-ENTMCNC: 30.9 GM/DL
MCHC RBC-ENTMCNC: 31.6 PG
MCV RBC AUTO: 102.2 FL
MONOCYTES # BLD AUTO: 0.34 K/UL
MONOCYTES NFR BLD AUTO: 7 %
NEUTROPHILS # BLD AUTO: 2.85 K/UL
NEUTROPHILS NFR BLD AUTO: 58.6 %
NONHDLC SERPL-MCNC: 95 MG/DL
PLATELET # BLD AUTO: 287 K/UL
POTASSIUM SERPL-SCNC: 4.1 MMOL/L
PROT SERPL-MCNC: 7.2 G/DL
RBC # BLD: 3.64 M/UL
RBC # FLD: 14.3 %
SODIUM SERPL-SCNC: 138 MMOL/L
TIBC SERPL-MCNC: 309 UG/DL
TRIGL SERPL-MCNC: 92 MG/DL
TSH SERPL-ACNC: 1.38 UIU/ML
UIBC SERPL-MCNC: 249 UG/DL
VIT B12 SERPL-MCNC: 1322 PG/ML
WBC # FLD AUTO: 4.86 K/UL

## 2022-11-26 ENCOUNTER — APPOINTMENT (OUTPATIENT)
Dept: INTERNAL MEDICINE | Facility: CLINIC | Age: 32
End: 2022-11-26

## 2022-11-26 VITALS
TEMPERATURE: 97.9 F | HEART RATE: 87 BPM | DIASTOLIC BLOOD PRESSURE: 60 MMHG | SYSTOLIC BLOOD PRESSURE: 90 MMHG | OXYGEN SATURATION: 99 %

## 2022-11-26 DIAGNOSIS — R07.0 PAIN IN THROAT: ICD-10-CM

## 2022-11-26 PROCEDURE — 87880 STREP A ASSAY W/OPTIC: CPT | Mod: QW

## 2022-11-26 PROCEDURE — 99213 OFFICE O/P EST LOW 20 MIN: CPT | Mod: 25

## 2022-11-26 RX ORDER — BENZONATATE 200 MG/1
200 CAPSULE ORAL
Qty: 40 | Refills: 1 | Status: DISCONTINUED | COMMUNITY
Start: 2022-08-24 | End: 2022-11-26

## 2022-11-26 NOTE — HISTORY OF PRESENT ILLNESS
[FreeTextEntry1] : Throat pain [de-identified] : Patient presents complaining of throat pain for the last 3 days moderate intermittent.  No difficulty swallowing food however painful swallowing of saliva and urinary.  No cough fever chest pain shortness of breath.

## 2022-11-26 NOTE — PHYSICAL EXAM
[Normal] : no jugular venous distention, supple, no lymphadenopathy and the thyroid was normal and there were no nodules present [No Respiratory Distress] : no respiratory distress  [Clear to Auscultation] : lungs were clear to auscultation bilaterally [de-identified] : Erythema pharynx no exudates

## 2022-11-26 NOTE — ASSESSMENT
[FreeTextEntry1] : Throat pain rapid strep negative will confirm with throat culture.  Could be viral.  Magic mouthwash\par Follow-up 1 week as needed

## 2022-12-01 LAB — BACTERIA THROAT CULT: NORMAL

## 2023-03-15 NOTE — PATIENT PROFILE ADULT - NSPROSPHOSPCHAPLAINYN_GEN_A_NUR
Source: Patient [ x]  Family [ ]   other [ x]    Current Diet: Diet, Clear Liquid (03-14-23 @ 13:49)    Patient reports [ ] nausea  [ ] vomiting [ ] diarrhea [ ] constipation  [ ]chewing problems [ ] swallowing issues  [ ] other:     PO intake:  < 50% [ ]   50-75%  [ x]   %  [ ]  other :    Current Weight:   (3/9) 188.4 lbs  (3/8) 196.2 lbs  (3/7) 199.9 lbs  (3/6) 201.7 lbs  (3/5) 204.8 lbs  (3/4) 206.5 lbs  (3/3) 193.5 lbs  (3/2) 200.1 lbs  (2/28) 191.8 lbs  ? accuracy of weights, noted with 1+ generalized edema, continue to trend and maintain strict Is&Os     Pertinent Medications: MEDICATIONS  (STANDING):  cloNIDine Patch 0.1 mG/24Hr(s) 1 patch Transdermal every 7 days  furosemide    Tablet 40 milliGRAM(s) Oral daily  heparin   Injectable 5000 Unit(s) SubCutaneous every 8 hours  insulin lispro (ADMELOG) corrective regimen sliding scale   SubCutaneous Before meals and at bedtime  metoprolol tartrate 37.5 milliGRAM(s) Oral two times a day  pantoprazole    Tablet 40 milliGRAM(s) Oral before breakfast  senna 2 Tablet(s) Oral at bedtime  sodium chloride 0.9% lock flush 3 milliLiter(s) IV Push every 8 hours    MEDICATIONS  (PRN):  benzocaine 20% Spray 1 Spray(s) Topical every 4 hours PRN Discomfort  hydrALAZINE Injectable 10 milliGRAM(s) IV Push every 4 hours PRN SBP >160  HYDROmorphone  Injectable 0.5 milliGRAM(s) IV Push every 3 hours PRN Severe Pain (7 - 10)    Pertinent Labs: CBC Full  -  ( 15 Mar 2023 02:40 )  WBC Count : 4.90 K/uL  RBC Count : 4.18 M/uL  Hemoglobin : 10.6 g/dL  Hematocrit : 34.4 %  Platelet Count - Automated : 190 K/uL  Mean Cell Volume : 82.3 fl  Mean Cell Hemoglobin : 25.4 pg  Mean Cell Hemoglobin Concentration : 30.8 gm/dL  Auto Neutrophil # : x  Auto Lymphocyte # : x  Auto Monocyte # : x  Auto Eosinophil # : x  Auto Basophil # : x  Auto Neutrophil % : x  Auto Lymphocyte % : x  Auto Monocyte % : x  Auto Eosinophil % : x  Auto Basophil % : x    03-15 Na135 mmol/L Glu 108 mg/dL<H> K+ 3.5 mmol/L Cr  0.94 mg/dL BUN 8.1 mg/dL Phos n/a   Alb n/a   PAB n/a       Skin: Surgical incision to abdomen per documentation    Nutrition focused physical exam conducted - found signs of malnutrition [ ]absent [x ]present    Subcutaneous fat loss: [ ] Orbital fat pads region, [ ]Buccal fat region, [ ]Triceps region,  [ ]Ribs region    Muscle wasting: [ ]Temples region, [ x]Clavicle region, [x ]Shoulder region, [ ]Scapula region, [ ]Interosseous region,  [ ]thigh region, [ ]Calf region    Estimated Needs:   [ x] no change since previous assessment  [ ] recalculated:     Current Nutrition Diagnosis: Pt remains at high nutrition risk secondary to malnutrition (moderate, acute) related to inability to meet sufficient protein-energy in setting of type B dissection, right radial partial thrombosis, right cephalic and basilic vein thrombophlebitis, and HTN, s/p celiac/SMA bypass via minilaparotomy c/b postop ileus as evidenced by meeting <50% nutrient needs >/5 days, mild muscle loss of temples and clavicles, 1+ generalized edema. Pt has been NPO/on clears x 7 days; currently on clears; noted with liquid BMs. RD to follow up.     Recommendations:   1) Continue diet as tolerated.   2) Ensure Clear TID to optimize po intake and provide an additional 240 kcal, 8g protein per serving.  3) Advance diet as medically feasible/tolerable.  4) Rx: MVI and vitamin C 500mg daily.  5) Encourage po intake, monitor diet tolerance, and provide assistance at meals as needed.  6) Obtain daily weights to monitor trends.     Monitoring and Evaluation:   [ x] PO intake [ x] Tolerance to diet prescription [X] Weights  [X] Follow up per protocol [X] Labs: chem 8, mg, phos, H/H Source: Patient [ x]  Family [ ]   other [ x]    Current Diet: Diet, Clear Liquid (03-14-23 @ 13:49)    Patient reports [ ] nausea  [ ] vomiting [ ] diarrhea [ ] constipation  [ ]chewing problems [ ] swallowing issues  [ ] other:     PO intake:  < 50% [ ]   50-75%  [ x]   %  [ ]  other :    Current Weight:   (3/9) 188.4 lbs  (3/8) 196.2 lbs  (3/7) 199.9 lbs  (3/6) 201.7 lbs  (3/5) 204.8 lbs  (3/4) 206.5 lbs  (3/3) 193.5 lbs  (3/2) 200.1 lbs  (2/28) 191.8 lbs  ? accuracy of weights, noted with 1+ generalized edema, continue to trend and maintain strict Is&Os     Pertinent Medications: MEDICATIONS  (STANDING):  cloNIDine Patch 0.1 mG/24Hr(s) 1 patch Transdermal every 7 days  furosemide    Tablet 40 milliGRAM(s) Oral daily  heparin   Injectable 5000 Unit(s) SubCutaneous every 8 hours  insulin lispro (ADMELOG) corrective regimen sliding scale   SubCutaneous Before meals and at bedtime  metoprolol tartrate 37.5 milliGRAM(s) Oral two times a day  pantoprazole    Tablet 40 milliGRAM(s) Oral before breakfast  senna 2 Tablet(s) Oral at bedtime  sodium chloride 0.9% lock flush 3 milliLiter(s) IV Push every 8 hours    MEDICATIONS  (PRN):  benzocaine 20% Spray 1 Spray(s) Topical every 4 hours PRN Discomfort  hydrALAZINE Injectable 10 milliGRAM(s) IV Push every 4 hours PRN SBP >160  HYDROmorphone  Injectable 0.5 milliGRAM(s) IV Push every 3 hours PRN Severe Pain (7 - 10)    Pertinent Labs: CBC Full  -  ( 15 Mar 2023 02:40 )  WBC Count : 4.90 K/uL  RBC Count : 4.18 M/uL  Hemoglobin : 10.6 g/dL  Hematocrit : 34.4 %  Platelet Count - Automated : 190 K/uL  Mean Cell Volume : 82.3 fl  Mean Cell Hemoglobin : 25.4 pg  Mean Cell Hemoglobin Concentration : 30.8 gm/dL  Auto Neutrophil # : x  Auto Lymphocyte # : x  Auto Monocyte # : x  Auto Eosinophil # : x  Auto Basophil # : x  Auto Neutrophil % : x  Auto Lymphocyte % : x  Auto Monocyte % : x  Auto Eosinophil % : x  Auto Basophil % : x    03-15 Na135 mmol/L Glu 108 mg/dL<H> K+ 3.5 mmol/L Cr  0.94 mg/dL BUN 8.1 mg/dL Phos n/a   Alb n/a   PAB n/a       Skin: Surgical incision to abdomen per documentation    Nutrition focused physical exam conducted - found signs of malnutrition [ ]absent [x ]present    Subcutaneous fat loss: [ ] Orbital fat pads region, [ ]Buccal fat region, [ ]Triceps region,  [ ]Ribs region    Muscle wasting: [ ]Temples region, [ x]Clavicle region, [x ]Shoulder region, [ ]Scapula region, [ ]Interosseous region,  [ ]thigh region, [ ]Calf region    Estimated Needs:   [ x] no change since previous assessment  [ ] recalculated:     Current Nutrition Diagnosis: Pt remains at high nutrition risk secondary to malnutrition (moderate, acute) related to inability to meet sufficient protein-energy in setting of type B dissection, right radial partial thrombosis, right cephalic and basilic vein thrombophlebitis, and HTN, s/p celiac/SMA bypass via minilaparotomy c/b postop ileus as evidenced by meeting <50% nutrient needs >/5 days, mild muscle loss of clavicles and shoulders, 1+ generalized edema. Pt has been NPO/on clears x 7 days; currently on clears; noted with liquid BMs. RD to follow up.     Recommendations:   1) Continue diet as tolerated.   2) Ensure Clear TID to optimize po intake and provide an additional 240 kcal, 8g protein per serving.  3) Advance diet as medically feasible/tolerable.  4) Rx: MVI and vitamin C 500mg daily.  5) Encourage po intake, monitor diet tolerance, and provide assistance at meals as needed.  6) Obtain daily weights to monitor trends.     Monitoring and Evaluation:   [ x] PO intake [ x] Tolerance to diet prescription [X] Weights  [X] Follow up per protocol [X] Labs: chem 8, mg, phos, H/H no

## 2023-05-09 NOTE — ED ADULT NURSE NOTE - NS ED NURSE DISCH DISPOSITION
Pharmacokinetics by Pharmacy - Vancomycin    Emre Lisa is a 59 y.o. male receiving vancomycin day 26 for bone and/or joint infection    Objective:  [Ht: 182.9 cm; Wt: (!) 170 kg (375 lb 11.2 oz)]     WBC   Date Value Ref Range Status   05/09/2023 5.76 3.40 - 10.80 10*3/mm3 Final   05/08/2023 4.55 3.40 - 10.80 10*3/mm3 Final   05/07/2023 4.54 3.40 - 10.80 10*3/mm3 Final    No results found for: CRP, LACTATE   Temp Readings from Last 1 Encounters:   04/21/23 98.6 °F (37 °C)     Estimated Creatinine Clearance: 113.7 mL/min (by C-G formula based on SCr of 1.11 mg/dL).   Creatinine   Date Value Ref Range Status   05/09/2023 1.11 0.76 - 1.27 mg/dL Final   05/08/2023 1.07 0.76 - 1.27 mg/dL Final   05/07/2023 1.24 0.76 - 1.27 mg/dL Final       No results found for: VANCOPEAK, VANCOTROUGH, VANCORANDOM    Culture Results:  Microbiology Results (last 10 days)     ** No results found for the last 240 hours. **        No results found for: RESPCX      Assessment:    WBC 5.76, WNL   Scr 1.11, WNL and stable  No new cultures     Vancomycin levels WNL on 5/5/23. Trough and AUC goals 10-20 and 400-600, respectively.    Plan:    1. Continue vancomycin 1250mg IV Q12H. Consult ends 5/26/23.   2. Vancomycin peak on 5/9 at 1830 and vancomycin trough on 5/10 at 0330.  3. Pharmacy will monitor renal function and adjust dose accordingly.     Eloy Mendoza [facility]   05/09/23 09:34 CDT   Discharged

## 2023-09-12 NOTE — DISCHARGE NOTE OB - BREAST MILK SUPPORTS STABLE NEWBORN BLOOD SUGAR
Post-Anesthesia Patient Instructions    IMMEDIATELY FOLLOWING SURGERY:  Do not drive or operate machinery for the first twenty four hours after surgery.  Do not make any important decisions for twenty four hours after surgery or while taking narcotic pain medications or sedatives.  If you develop intractable nausea and vomiting or a severe headache please notify your doctor immediately.    FOLLOW-UP:  Please make an appointment with your surgeon as instructed. You do not need to follow up with anesthesia unless specifically instructed to do so.    WOUND CARE INSTRUCTIONS (if applicable):  Keep a dry clean dressing on the anesthesia/puncture wound site if there is drainage.  Once the wound has quit draining you may leave it open to air.  Generally you should leave the bandage intact for twenty four hours unless there is drainage.  If the epidural site drains for more than 36-48 hours please call the anesthesia department.    QUESTIONS?:  Please feel free to call your physician or the hospital  if you have any questions, and they will be happy to assist you.      Statement Selected

## 2023-10-26 ENCOUNTER — LABORATORY RESULT (OUTPATIENT)
Age: 33
End: 2023-10-26

## 2023-10-26 ENCOUNTER — MED ADMIN CHARGE (OUTPATIENT)
Age: 33
End: 2023-10-26

## 2023-10-26 ENCOUNTER — APPOINTMENT (OUTPATIENT)
Dept: INTERNAL MEDICINE | Facility: CLINIC | Age: 33
End: 2023-10-26
Payer: COMMERCIAL

## 2023-10-26 VITALS
RESPIRATION RATE: 17 BRPM | BODY MASS INDEX: 22.63 KG/M2 | HEART RATE: 84 BPM | SYSTOLIC BLOOD PRESSURE: 120 MMHG | WEIGHT: 123 LBS | OXYGEN SATURATION: 99 % | DIASTOLIC BLOOD PRESSURE: 80 MMHG | HEIGHT: 62 IN

## 2023-10-26 DIAGNOSIS — K21.9 GASTRO-ESOPHAGEAL REFLUX DISEASE W/OUT ESOPHAGITIS: ICD-10-CM

## 2023-10-26 DIAGNOSIS — Z00.00 ENCOUNTER FOR GENERAL ADULT MEDICAL EXAMINATION W/OUT ABNORMAL FINDINGS: ICD-10-CM

## 2023-10-26 DIAGNOSIS — G43.709 CHRONIC MIGRAINE W/OUT AURA, NOT INTRACTABLE, W/OUT STATUS MIGRAINOSUS: ICD-10-CM

## 2023-10-26 DIAGNOSIS — F41.9 ANXIETY DISORDER, UNSPECIFIED: ICD-10-CM

## 2023-10-26 DIAGNOSIS — K44.9 DIAPHRAGMATIC HERNIA W/OUT OBSTRUCTION OR GANGRENE: ICD-10-CM

## 2023-10-26 DIAGNOSIS — K59.00 CONSTIPATION, UNSPECIFIED: ICD-10-CM

## 2023-10-26 DIAGNOSIS — R10.9 UNSPECIFIED ABDOMINAL PAIN: ICD-10-CM

## 2023-10-26 DIAGNOSIS — G93.0 CEREBRAL CYSTS: ICD-10-CM

## 2023-10-26 DIAGNOSIS — D50.9 IRON DEFICIENCY ANEMIA, UNSPECIFIED: ICD-10-CM

## 2023-10-26 DIAGNOSIS — Z23 ENCOUNTER FOR IMMUNIZATION: ICD-10-CM

## 2023-10-26 PROCEDURE — 90686 IIV4 VACC NO PRSV 0.5 ML IM: CPT

## 2023-10-26 PROCEDURE — 93000 ELECTROCARDIOGRAM COMPLETE: CPT

## 2023-10-26 PROCEDURE — 99395 PREV VISIT EST AGE 18-39: CPT | Mod: 25

## 2023-10-26 PROCEDURE — 36415 COLL VENOUS BLD VENIPUNCTURE: CPT

## 2023-10-26 PROCEDURE — 99214 OFFICE O/P EST MOD 30 MIN: CPT | Mod: 25

## 2023-10-26 PROCEDURE — G0008: CPT

## 2023-10-28 LAB
25(OH)D3 SERPL-MCNC: 49.5 NG/ML
ALBUMIN SERPL ELPH-MCNC: 4.6 G/DL
ALP BLD-CCNC: 64 U/L
ALT SERPL-CCNC: 13 U/L
ANION GAP SERPL CALC-SCNC: 13 MMOL/L
APPEARANCE: CLEAR
AST SERPL-CCNC: 17 U/L
BASOPHILS # BLD AUTO: 0.04 K/UL
BASOPHILS NFR BLD AUTO: 0.8 %
BILIRUB SERPL-MCNC: 0.6 MG/DL
BILIRUBIN URINE: NEGATIVE
BLOOD URINE: NEGATIVE
BUN SERPL-MCNC: 11 MG/DL
CALCIUM SERPL-MCNC: 9.2 MG/DL
CHLORIDE SERPL-SCNC: 103 MMOL/L
CHOLEST SERPL-MCNC: 139 MG/DL
CO2 SERPL-SCNC: 24 MMOL/L
COLOR: YELLOW
CREAT SERPL-MCNC: 0.62 MG/DL
EGFR: 119 ML/MIN/1.73M2
EOSINOPHIL # BLD AUTO: 0.1 K/UL
EOSINOPHIL NFR BLD AUTO: 2.1 %
ESTIMATED AVERAGE GLUCOSE: 105 MG/DL
FERRITIN SERPL-MCNC: 49 NG/ML
FOLATE SERPL-MCNC: >20 NG/ML
GLUCOSE QUALITATIVE U: NEGATIVE MG/DL
GLUCOSE SERPL-MCNC: 90 MG/DL
HBA1C MFR BLD HPLC: 5.3 %
HCT VFR BLD CALC: 37.3 %
HDLC SERPL-MCNC: 58 MG/DL
HGB BLD-MCNC: 11.7 G/DL
IMM GRANULOCYTES NFR BLD AUTO: 0.4 %
IRON SERPL-MCNC: 94 UG/DL
KETONES URINE: NEGATIVE MG/DL
LDLC SERPL CALC-MCNC: 69 MG/DL
LEUKOCYTE ESTERASE URINE: NEGATIVE
LYMPHOCYTES # BLD AUTO: 1.68 K/UL
LYMPHOCYTES NFR BLD AUTO: 35.3 %
MAGNESIUM SERPL-MCNC: 2 MG/DL
MAN DIFF?: NORMAL
MCHC RBC-ENTMCNC: 31.4 GM/DL
MCHC RBC-ENTMCNC: 33.3 PG
MCV RBC AUTO: 106.3 FL
MONOCYTES # BLD AUTO: 0.35 K/UL
MONOCYTES NFR BLD AUTO: 7.4 %
NEUTROPHILS # BLD AUTO: 2.57 K/UL
NEUTROPHILS NFR BLD AUTO: 54 %
NITRITE URINE: NEGATIVE
NONHDLC SERPL-MCNC: 81 MG/DL
PH URINE: 6.5
PLATELET # BLD AUTO: 266 K/UL
POTASSIUM SERPL-SCNC: 3.8 MMOL/L
PROT SERPL-MCNC: 7.3 G/DL
PROTEIN URINE: NEGATIVE MG/DL
RBC # BLD: 3.51 M/UL
RBC # FLD: 13.1 %
SODIUM SERPL-SCNC: 140 MMOL/L
SPECIFIC GRAVITY URINE: 1.01
TRIGL SERPL-MCNC: 56 MG/DL
TSH SERPL-ACNC: 1.7 UIU/ML
UROBILINOGEN URINE: 0.2 MG/DL
VIT B12 SERPL-MCNC: 1165 PG/ML
WBC # FLD AUTO: 4.76 K/UL

## 2023-11-02 LAB — VIT B1 SERPL-MCNC: 109.6 NMOL/L

## 2023-12-21 DIAGNOSIS — J32.9 CHRONIC SINUSITIS, UNSPECIFIED: ICD-10-CM

## 2023-12-21 RX ORDER — NEOMYCIN SULFATE, POLYMYXIN B SULFATE AND DEXAMETHASONE 3.5; 10000; 1 MG/ML; [USP'U]/ML; MG/ML
3.5-10000-0.1 SUSPENSION OPHTHALMIC
Qty: 1 | Refills: 0 | Status: ACTIVE | COMMUNITY
Start: 2023-12-21 | End: 1900-01-01

## 2023-12-21 RX ORDER — AMOXICILLIN 500 MG/1
500 CAPSULE ORAL 3 TIMES DAILY
Qty: 21 | Refills: 0 | Status: ACTIVE | COMMUNITY
Start: 2023-12-21 | End: 1900-01-01

## 2024-01-18 RX ORDER — BUTALBITAL, ACETAMINOPHEN AND CAFFEINE 325; 50; 40 MG/1; MG/1; MG/1
50-325-40 TABLET ORAL
Qty: 30 | Refills: 0 | Status: ACTIVE | COMMUNITY
Start: 1900-01-01 | End: 1900-01-01

## 2024-04-19 NOTE — PATIENT PROFILE ADULT - MEDICATIONS/VISITS
4 mo presents with few hour hx of barky cough and stridor.  NO fevers, drinking well with normal urine output.  Immunizations utd. no

## 2024-05-16 RX ORDER — ALPRAZOLAM 0.5 MG/1
0.5 TABLET ORAL DAILY
Qty: 30 | Refills: 0 | Status: ACTIVE | COMMUNITY
Start: 1900-01-01 | End: 1900-01-01

## 2024-07-28 NOTE — PATIENT PROFILE ADULT - NSPROPTRIGHTSUPPORTPERSON_GEN_A_NUR
INTERVENTIONAL RADIOLOGY LOCAL ASSESSMENT NOTE:     Patient: Luis Cox  Date: 2024  : 1940 Attending: Jose Benites DO  84 year old   male            PROCEDURE ASSESSMENT- LOCAL ANESTHESIA     Preop Diagnosis:  No diagnosis found.    Indications for Procedure: Hydronephrosis, Clogged / Broken Tube     Planned Procedure: Nephrostomy Tube change Bilateral    Planned Anesthetic:  local     MEDICAL HISTORY / COMORBID CONDITIONS:  Reviewed. See below.     Normal mental status:   yes     EXAMINATION PERTINENT TO PROCEDURE BEING PERFORMED  Evaluation of operative site: Examination of operative site completed, WNL.      OTHER FINDINGS  Reviewed current medications and allergies yes     PERTINENT LAB / DIAGNOSTIC TESTSPERTINENT LAB / DIAGNOSTIC TESTS  Reviewed. See below.     INFORMED CONSENT  Consent obtained: yes  Risks / benefits, complications, and alternatives explained, questions answered and patient / personal representative agrees to:  procedure listed above and anesthetic listed above     Jordyn Yeung MD           INFORMATION REVIEWED  Past Medical History:   Diagnosis Date    Abnormal stool color 2023    pt states \"black stools\" msg sent to GI office    Aortic valve regurgitation 2017    Mild to moderate AV regurgitation on echocardiogram    Atrial fibrillation  (CMD)     Coumadin Therapy,Dr.H. Sanchez, [facility]. AF RFA 2011. Patient was found to be in atrial fibrillation at office visit in .    BPH with obstruction/lower urinary tract symptoms     Cancer of skin     Basal cell    Carcinoma in situ in rectal polyp 2014    Cardiac pacemaker in situ; HX of 2013    ABBOTT/DEFIBRILLATOR-2018; ..Implanted:  11/15/12 [employer] PPM Indications: SND. AF Remote Monitor: YES  Anticoagulation: Xarelto ECHO: 04/23/10:  LVEF 45%  LA 4.8cm    Chronic congestive splenomegaly     Colon polyps     Coronary artery disease 2018    30-40%  tachycardia  (CMD)      Patient Active Problem List   Diagnosis    Osteoarthrosis, unspecified whether generalized or localized, lower leg    Other and unspecified hyperlipidemia    Personal history of malignant neoplasm of prostate    Essential hypertension    Benign neoplasm of colon    Gastroenteritis and colitis due to radiation    Unspecified hypothyroidism    Long term (current) use of anticoagulants    Chronic atrial fibrillation (CMS/HCC)    MILD OBSTRUCTIVE SLEEP APNEA    Persistent disorder of initiating or maintaining sleep    Malignant neoplasm of prostate  (CMD)    Gynecomastia    Nocturnal hypoxia    Encounter for long-term (current) use of medications    Prediabetes    Sinoatrial node dysfunction (CMS/HCC)    Rectal cancer  (CMD)    Thyroid nodule    Colostomy hernia  (CMD)    Aortic valve regurgitation    Mitral valve regurgitation    Coronary artery disease    Tricuspid valve regurgitation    Nonischemic congestive cardiomyopathy (CMS/HCC)/dilated cardiomyopathy    Obesity with body mass index (BMI) of 30.0 to 39.9    Parastomal hernia without obstruction or gangrene    [facility] ICD 4/19/2018    Complex sleep apnea syndrome    OLIVEIRA (dyspnea on exertion)    Chronic kidney disease (CKD), stage III (moderate)  (CMD)    Fatigue    Acute renal failure, unspecified acute renal failure type (CMD)    Left displaced femoral neck fracture  (CMD)    Hydronephrosis due to obstruction of ureter    Closed left hip fracture, sequela    Age-related osteoporosis without current pathological fracture      Pertinent Labs  Protime- PT (sec)   Date Value   06/19/2024 10.9      INR (no units)   Date Value   06/19/2024 1.0     WBC (K/mcL)   Date Value   07/02/2024 10.4     RBC (mil/mcL)   Date Value   07/02/2024 3.86 (L)     HCT (%)   Date Value   07/02/2024 35.1 (L)     HGB (g/dL)   Date Value   07/02/2024 11.0 (L)     PLT (K/mcL)   Date Value   07/02/2024 360                  same name as above

## 2024-08-29 NOTE — ED ADULT NURSE NOTE - NS ED NOTE  TALK SOMEONE YN
mary carmen seen discussed with staff on rounds
patient seen discussed with staff on multidisciplinary rounds,
No

## 2024-09-04 NOTE — ED ADULT TRIAGE NOTE - INTERNATIONAL TRAVEL
Speech Language Pathology Treatment Note  [address] [place] [address]    Patient: Justin Londono                                                                                     Visit Date: 2024  :     1957    Referring practitioner:    Maite Dodd PA-C  Date of Initial Visit:       2024          Visit Diagnoses:    ICD-10-CM ICD-9-CM   1. Aphasia  R47.01 784.3   2. Apraxia  R48.2 784.69       SUBJECTIVE     Patient arrived alert and ready for therapy today.     OBJECTIVE   GOALS    Goals                                         STG Comments Status   Patient will improve verbal expressive language skills by completing automatic speech tasks, naming objects/pictures, and completing open-ended phrases/sentences Patient was able to verbally provide 4-12(average 8.2) correct information units when shown picture scenes from the [facility] picture book (#11-21). He also named pictures w/o demand in the context of reading tasks.  Ongoing       Patient will improve comprehension of spoken language by following 1 step then two step directions presented verbally and/or in writing Previous: Patient was able to listen to word descriptions presented verbally and identify the correct response in f/3 with 90% accuracy.  Ongoing   Patient will improve comprehension and expression of written language skills by complete functional reading and writing tasks Patient matched picture to written word with 100% accuracy.  Ongoing   Patient will demonstrate understanding of and use AAC device to augment communication and express wants and needs Application for AAC in progress. Application has gone to insurance review.       Ongoing   LTG by:        Patient will be able to verbally and or in writing express basic wants and needs in home environment Patient was able to verbally provide an average of 8.2 correct information units  given 10 different picture scenes.   Ongoing   Patient will comprehend basic spoken and written information presented in home environment Patient was able to read and identify words and pictures given written words .  Ongoing           Total Time of Visit:            50  mins         ASSESSMENT/PLAN     ASSESSMENT: Patient again showed good improvement with correct information units verbally today. He continues to make progress.     PLAN: Continue therapy.     Progress Note Due Date: 9/17/2024  Recertification Due Date: 9/17/2024        Signature:  Zabrina Bhat CCC-SLP, [place] License #: [id number]  Electronically signed on 9/4/2024          [address] [place] [address] [place] [address]  [phone]   No

## 2024-09-15 ENCOUNTER — EMERGENCY (EMERGENCY)
Facility: HOSPITAL | Age: 34
LOS: 0 days | Discharge: HOME | End: 2024-09-16
Attending: EMERGENCY MEDICINE
Payer: COMMERCIAL

## 2024-09-15 VITALS
SYSTOLIC BLOOD PRESSURE: 104 MMHG | WEIGHT: 123.9 LBS | TEMPERATURE: 99 F | HEART RATE: 97 BPM | OXYGEN SATURATION: 100 % | RESPIRATION RATE: 19 BRPM | DIASTOLIC BLOOD PRESSURE: 59 MMHG

## 2024-09-15 DIAGNOSIS — Z87.19 PERSONAL HISTORY OF OTHER DISEASES OF THE DIGESTIVE SYSTEM: ICD-10-CM

## 2024-09-15 DIAGNOSIS — M25.512 PAIN IN LEFT SHOULDER: ICD-10-CM

## 2024-09-15 DIAGNOSIS — K86.81 EXOCRINE PANCREATIC INSUFFICIENCY: ICD-10-CM

## 2024-09-15 DIAGNOSIS — M54.9 DORSALGIA, UNSPECIFIED: ICD-10-CM

## 2024-09-15 DIAGNOSIS — M25.511 PAIN IN RIGHT SHOULDER: ICD-10-CM

## 2024-09-15 DIAGNOSIS — R10.9 UNSPECIFIED ABDOMINAL PAIN: ICD-10-CM

## 2024-09-15 DIAGNOSIS — K21.9 GASTRO-ESOPHAGEAL REFLUX DISEASE WITHOUT ESOPHAGITIS: ICD-10-CM

## 2024-09-15 DIAGNOSIS — R11.0 NAUSEA: ICD-10-CM

## 2024-09-15 DIAGNOSIS — Z90.49 ACQUIRED ABSENCE OF OTHER SPECIFIED PARTS OF DIGESTIVE TRACT: ICD-10-CM

## 2024-09-15 DIAGNOSIS — R10.13 EPIGASTRIC PAIN: ICD-10-CM

## 2024-09-15 DIAGNOSIS — Z98.84 BARIATRIC SURGERY STATUS: ICD-10-CM

## 2024-09-15 DIAGNOSIS — Z90.49 ACQUIRED ABSENCE OF OTHER SPECIFIED PARTS OF DIGESTIVE TRACT: Chronic | ICD-10-CM

## 2024-09-15 LAB
ALBUMIN SERPL ELPH-MCNC: 3.1 G/DL — LOW (ref 3.3–5)
ALP SERPL-CCNC: 53 U/L — SIGNIFICANT CHANGE UP (ref 40–120)
ALT FLD-CCNC: 19 U/L — SIGNIFICANT CHANGE UP (ref 12–78)
ANION GAP SERPL CALC-SCNC: 6 MMOL/L — SIGNIFICANT CHANGE UP (ref 5–17)
AST SERPL-CCNC: 16 U/L — SIGNIFICANT CHANGE UP (ref 15–37)
BASOPHILS # BLD AUTO: 0.03 K/UL — SIGNIFICANT CHANGE UP (ref 0–0.2)
BASOPHILS NFR BLD AUTO: 0.7 % — SIGNIFICANT CHANGE UP (ref 0–2)
BILIRUB SERPL-MCNC: 0.2 MG/DL — SIGNIFICANT CHANGE UP (ref 0.2–1.2)
BUN SERPL-MCNC: 18 MG/DL — SIGNIFICANT CHANGE UP (ref 7–23)
CALCIUM SERPL-MCNC: 8.6 MG/DL — SIGNIFICANT CHANGE UP (ref 8.5–10.1)
CHLORIDE SERPL-SCNC: 112 MMOL/L — HIGH (ref 96–108)
CO2 SERPL-SCNC: 24 MMOL/L — SIGNIFICANT CHANGE UP (ref 22–31)
CREAT SERPL-MCNC: 0.73 MG/DL — SIGNIFICANT CHANGE UP (ref 0.5–1.3)
EGFR: 110 ML/MIN/1.73M2 — SIGNIFICANT CHANGE UP
EOSINOPHIL # BLD AUTO: 0.08 K/UL — SIGNIFICANT CHANGE UP (ref 0–0.5)
EOSINOPHIL NFR BLD AUTO: 1.9 % — SIGNIFICANT CHANGE UP (ref 0–6)
GLUCOSE SERPL-MCNC: 75 MG/DL — SIGNIFICANT CHANGE UP (ref 70–99)
HCT VFR BLD CALC: 33.8 % — LOW (ref 34.5–45)
HGB BLD-MCNC: 11.1 G/DL — LOW (ref 11.5–15.5)
IMM GRANULOCYTES NFR BLD AUTO: 0 % — SIGNIFICANT CHANGE UP (ref 0–0.9)
LYMPHOCYTES # BLD AUTO: 1.88 K/UL — SIGNIFICANT CHANGE UP (ref 1–3.3)
LYMPHOCYTES # BLD AUTO: 44.1 % — HIGH (ref 13–44)
MCHC RBC-ENTMCNC: 32.8 GM/DL — SIGNIFICANT CHANGE UP (ref 32–36)
MCHC RBC-ENTMCNC: 32.9 PG — SIGNIFICANT CHANGE UP (ref 27–34)
MCV RBC AUTO: 100.3 FL — HIGH (ref 80–100)
MONOCYTES # BLD AUTO: 0.36 K/UL — SIGNIFICANT CHANGE UP (ref 0–0.9)
MONOCYTES NFR BLD AUTO: 8.5 % — SIGNIFICANT CHANGE UP (ref 2–14)
NEUTROPHILS # BLD AUTO: 1.91 K/UL — SIGNIFICANT CHANGE UP (ref 1.8–7.4)
NEUTROPHILS NFR BLD AUTO: 44.8 % — SIGNIFICANT CHANGE UP (ref 43–77)
PLATELET # BLD AUTO: 220 K/UL — SIGNIFICANT CHANGE UP (ref 150–400)
POCT URINE PREGNANCY TEST: NEGATIVE — SIGNIFICANT CHANGE UP
POTASSIUM SERPL-MCNC: 4.1 MMOL/L — SIGNIFICANT CHANGE UP (ref 3.5–5.3)
POTASSIUM SERPL-SCNC: 4.1 MMOL/L — SIGNIFICANT CHANGE UP (ref 3.5–5.3)
PROT SERPL-MCNC: 6.8 GM/DL — SIGNIFICANT CHANGE UP (ref 6–8.3)
RBC # BLD: 3.37 M/UL — LOW (ref 3.8–5.2)
RBC # FLD: 11.8 % — SIGNIFICANT CHANGE UP (ref 10.3–14.5)
SODIUM SERPL-SCNC: 142 MMOL/L — SIGNIFICANT CHANGE UP (ref 135–145)
WBC # BLD: 4.26 K/UL — SIGNIFICANT CHANGE UP (ref 3.8–10.5)
WBC # FLD AUTO: 4.26 K/UL — SIGNIFICANT CHANGE UP (ref 3.8–10.5)

## 2024-09-15 PROCEDURE — 36415 COLL VENOUS BLD VENIPUNCTURE: CPT

## 2024-09-15 PROCEDURE — 83690 ASSAY OF LIPASE: CPT

## 2024-09-15 PROCEDURE — 99284 EMERGENCY DEPT VISIT MOD MDM: CPT | Mod: 25

## 2024-09-15 PROCEDURE — 81025 URINE PREGNANCY TEST: CPT

## 2024-09-15 PROCEDURE — 74177 CT ABD & PELVIS W/CONTRAST: CPT | Mod: 26,MC

## 2024-09-15 PROCEDURE — 80053 COMPREHEN METABOLIC PANEL: CPT

## 2024-09-15 PROCEDURE — 83735 ASSAY OF MAGNESIUM: CPT

## 2024-09-15 PROCEDURE — 85025 COMPLETE CBC W/AUTO DIFF WBC: CPT

## 2024-09-15 PROCEDURE — 74177 CT ABD & PELVIS W/CONTRAST: CPT | Mod: MC

## 2024-09-15 PROCEDURE — 99285 EMERGENCY DEPT VISIT HI MDM: CPT

## 2024-09-15 RX ORDER — SODIUM CHLORIDE 9 MG/ML
1000 INJECTION INTRAMUSCULAR; INTRAVENOUS; SUBCUTANEOUS ONCE
Refills: 0 | Status: COMPLETED | OUTPATIENT
Start: 2024-09-15 | End: 2024-09-15

## 2024-09-15 RX ADMIN — SODIUM CHLORIDE 2000 MILLILITER(S): 9 INJECTION INTRAMUSCULAR; INTRAVENOUS; SUBCUTANEOUS at 23:04

## 2024-09-15 NOTE — ED PROVIDER NOTE - PROGRESS NOTE DETAILS
Case discussed with Dr. Kauffman on-call for Dr. Cordero.  he states that the patient may follow-up in the clinic within the next 2 weeks.  I have discussed the results with the patient.  She is currently not experiencing any pain at this time.  Will have her follow-up with her gastroenterologist for further treatment of her   Intermittent pain.

## 2024-09-15 NOTE — ED PROVIDER NOTE - CLINICAL SUMMARY MEDICAL DECISION MAKING FREE TEXT BOX
32 y/o F PMHx anxiety, gall stones s/p cholecystectomy, gilmar-en-y gastric bypass GERD, IBS, hiatal hernia, migraines and PCOS presents to the ED c/o abdominal pain that radiates to b/l shoulders and back. After eating the pain last 30 mins then resolves. Pt endorses fullness and bloating, nausea for the past week. Minimal pain, pain upon palpation in the epigastric region. Pt endorses 5 wks ago that they had 2 wks of diarrhea, fatty stool, light in color. Seen by  at [facility] and was diagnosed with pancreatic insufficiency, on Creon and multivitamins. Pt does not smoke, drink or do drugs. Pt has had multiple workups in the past for this. Pt has most recently had an MRI w/ IV contrast only. Currently do not have access yet since was done at [facility]. Pt contacted GI and they wanted her to come in to rule out bowel obstruction.     plan: CBC, CMP, lipase, IV fluids for hydration, pain control as needed, antiemetics as needed, CT of the abdomen and pelvis with IV and oral contrast using the bariatric protocol and discussed with patient's bariatric surgeon.

## 2024-09-15 NOTE — ED PROVIDER NOTE - PHYSICAL EXAMINATION
CONSTITUTIONAL: Well appearing, awake, alert, oriented to person, place, time/situation and in no apparent distress.  · ENMT: Airway patent, Nasal mucosa clear. Mouth with normal mucosa. Throat has no vesicles, no oropharyngeal exudates and uvula is midline.  · EYES: Clear bilaterally, pupils equal, round and reactive to light.  · CARDIAC: Normal rate, regular rhythm.  Heart sounds S1, S2.  No murmurs, rubs or gallops.  · RESPIRATORY: Breath sounds clear and equal bilaterally.  · GASTROINTESTINAL: Epigastric tenderness, Abdomen soft, Nondistended  · MUSCULOSKELETAL: Spine appears normal, range of motion is not limited, no muscle or joint tenderness  · NEUROLOGICAL: Alert and oriented, no focal deficits, no motor or sensory deficits.  · SKIN: Skin normal color for race, warm, dry and intact. No evidence of rash

## 2024-09-15 NOTE — ED PROVIDER NOTE - PATIENT PORTAL LINK FT
You can access the FollowMyHealth Patient Portal offered by [facility] by registering at the following website: [email or web address]. By joining [facility]’s FollowMyHealth portal, you will also be able to view your health information using other applications (apps) compatible with our system.

## 2024-09-15 NOTE — ED PROVIDER NOTE - NSFOLLOWUPINSTRUCTIONS_ED_ALL_ED_FT
Abdominal Pain    Many things can cause abdominal pain. Many times, abdominal pain is not caused by a disease and will improve without treatment. Your health care provider will do a physical exam to determine if there is a dangerous cause of your pain; blood tests and imaging may help determine the cause of your pain. However, in many cases, no cause may be found and you may need further testing as an outpatient. Monitor your abdominal pain for any changes.     SEEK IMMEDIATE MEDICAL CARE IF YOU HAVE ANY OF THE FOLLOWING SYMPTOMS: worsening abdominal pain, uncontrollable vomiting, profuse diarrhea, inability to have bowel movements or pass gas, black or bloody stools, fever accompanying chest pain or back pain, or fainting. These symptoms may represent a serious problem that is an emergency. Do not wait to see if the symptoms will go away. Get medical help right away. Call 911 and do not drive yourself to the hospital.      Follow-up with your bariatric surgeon and the next 2 weeks and follow-up with your gastroenterologist as soon as possible.

## 2024-09-15 NOTE — ED ADULT NURSE NOTE - OBJECTIVE STATEMENT
pt presents to ED w/ abdominal pain & bloating that has been present for 2 weeks. Pain radiates to the b/l shoulders & back. Pain worsens after eating & drinking anything. Endorses some nausea with eating but denies vomiting and diarrhea. She was experiencing diarrhea prior to these last two weeks for approx 15 days then she saw her GI doctor. Diarrhea has resolved since. Dx w/ fatty liver & pancreatic insuffiencey. Started taking Creon 1.5 weeks ago. PMHx of gastric bypass in 2022. No acute distress noted. Denies urinary changes and fevers.

## 2024-09-15 NOTE — ED PROVIDER NOTE - OBJECTIVE STATEMENT
34 y/o F PMHx anxiety, gall stones s/p cholescystectomy, GERD, IBS, hiatal hernia, migranes and PCOS presents to the ED c/o abdominal pain 34 y/o F PMHx anxiety, gall stones s/p cholecystectomy, gilmar-en-y gastric bypass GERD, IBS, hiatal hernia, migraines and PCOS presents to the ED c/o abdominal pain that radiates to b/l shoulders and back. After eating the pain last 30 mins then resolves. Pt endorses fullness and bloating, nausea for the past week. Minimal pain, pain upon palpation in the epigastric region. Pt endorses 5 wks ago that they had 2 wks of diarrhea, fatty stool, light in color. Seen by  at [facility] and Pt. reported pancreatic insufficiency, on Creon and multivitamins. Pt does not smoke, drink or do drugs. Pt has had multiple workups in the past for this. Pt has most recently had an MRI w/ IV contrast only. Currently do not have access yet since was done at [facility]. Pt contacted GI and they wanted her to come in to rule out bowel obstruction. 32 y/o F PMHx anxiety, gall stones s/p cholecystectomy, gilmar-en-y gastric bypass GERD, IBS, hiatal hernia, migraines and PCOS presents to the ED c/o abdominal pain that radiates to b/l shoulders and back. After eating the pain last 30 mins then resolves. Pt endorses fullness and bloating, nausea for the past week. Minimal pain, pain upon palpation in the epigastric region. Pt endorses 5 wks ago that they had 2 wks of diarrhea, fatty stool, light in color. Seen by  at [facility] and was diagnosed with pancreatic insufficiency, on Creon and multivitamins. Pt does not smoke, drink or do drugs. Pt has had multiple workups in the past for this. Pt has most recently had an MRI w/ IV contrast only. Currently do not have access yet since was done at [facility]. Pt contacted GI and they wanted her to come in to rule out bowel obstruction.

## 2024-09-15 NOTE — ED ADULT TRIAGE NOTE - CHIEF COMPLAINT QUOTE
gastric bypass dr dent in june of 2022. patient complains of worsening abdominal pain and bloating, pain worse after eating/drinking. reports having diarrhea 3 weeks ago, seen by GI and diagnosed with pancreatic insufficiency. spoke to GI today about abdominal pain, concerned about blockage and told to come to ED

## 2024-09-16 VITALS
SYSTOLIC BLOOD PRESSURE: 99 MMHG | DIASTOLIC BLOOD PRESSURE: 57 MMHG | OXYGEN SATURATION: 100 % | RESPIRATION RATE: 18 BRPM | HEART RATE: 105 BPM | TEMPERATURE: 98 F

## 2024-09-16 LAB
LIDOCAIN IGE QN: 45 U/L — SIGNIFICANT CHANGE UP (ref 13–75)
MAGNESIUM SERPL-MCNC: 2 MG/DL — SIGNIFICANT CHANGE UP (ref 1.6–2.6)

## 2024-09-16 RX ORDER — SODIUM CHLORIDE 9 MG/ML
1000 INJECTION INTRAMUSCULAR; INTRAVENOUS; SUBCUTANEOUS ONCE
Refills: 0 | Status: COMPLETED | OUTPATIENT
Start: 2024-09-16 | End: 2024-09-16

## 2024-09-16 RX ADMIN — SODIUM CHLORIDE 1000 MILLILITER(S): 9 INJECTION INTRAMUSCULAR; INTRAVENOUS; SUBCUTANEOUS at 01:17

## 2024-09-16 NOTE — PROVIDER CONTACT NOTE (OTHER) - SITUATION
paged @[phone] Dr. De Leon spoke to Dr. Kauffman (covering for Dr. Cordero, surgery)  paged @[phone], returned page @[phone]

## 2024-09-19 DIAGNOSIS — R19.7 DIARRHEA, UNSPECIFIED: ICD-10-CM

## 2024-09-21 NOTE — PATIENT PROFILE OB - AS DELIV COMPLICATIONS OB
about 1.5cm thin line with fluorescein uptake lower conjunctiva complains of pain/discomfort precipitous delivery

## 2024-09-30 ENCOUNTER — LABORATORY RESULT (OUTPATIENT)
Age: 34
End: 2024-09-30

## 2024-10-03 ENCOUNTER — MED ADMIN CHARGE (OUTPATIENT)
Age: 34
End: 2024-10-03

## 2024-10-03 ENCOUNTER — APPOINTMENT (OUTPATIENT)
Dept: INTERNAL MEDICINE | Facility: CLINIC | Age: 34
End: 2024-10-03
Payer: COMMERCIAL

## 2024-10-03 VITALS
RESPIRATION RATE: 18 BRPM | BODY MASS INDEX: 22.45 KG/M2 | OXYGEN SATURATION: 98 % | WEIGHT: 122 LBS | SYSTOLIC BLOOD PRESSURE: 110 MMHG | HEART RATE: 90 BPM | TEMPERATURE: 97.4 F | DIASTOLIC BLOOD PRESSURE: 72 MMHG | HEIGHT: 62 IN

## 2024-10-03 DIAGNOSIS — Z23 ENCOUNTER FOR IMMUNIZATION: ICD-10-CM

## 2024-10-03 DIAGNOSIS — K59.00 CONSTIPATION, UNSPECIFIED: ICD-10-CM

## 2024-10-03 DIAGNOSIS — K86.89 OTHER SPECIFIED DISEASES OF PANCREAS: ICD-10-CM

## 2024-10-03 DIAGNOSIS — N80.9 ENDOMETRIOSIS, UNSPECIFIED: ICD-10-CM

## 2024-10-03 DIAGNOSIS — K21.9 GASTRO-ESOPHAGEAL REFLUX DISEASE W/OUT ESOPHAGITIS: ICD-10-CM

## 2024-10-03 DIAGNOSIS — R10.9 UNSPECIFIED ABDOMINAL PAIN: ICD-10-CM

## 2024-10-03 DIAGNOSIS — F41.9 ANXIETY DISORDER, UNSPECIFIED: ICD-10-CM

## 2024-10-03 DIAGNOSIS — G43.709 CHRONIC MIGRAINE W/OUT AURA, NOT INTRACTABLE, W/OUT STATUS MIGRAINOSUS: ICD-10-CM

## 2024-10-03 DIAGNOSIS — D50.9 IRON DEFICIENCY ANEMIA, UNSPECIFIED: ICD-10-CM

## 2024-10-03 DIAGNOSIS — K44.9 DIAPHRAGMATIC HERNIA W/OUT OBSTRUCTION OR GANGRENE: ICD-10-CM

## 2024-10-03 DIAGNOSIS — Z00.00 ENCOUNTER FOR GENERAL ADULT MEDICAL EXAMINATION W/OUT ABNORMAL FINDINGS: ICD-10-CM

## 2024-10-03 PROCEDURE — G0008: CPT

## 2024-10-03 PROCEDURE — 78267 UREA BREATH TST C-14 ACQUISJ: CPT

## 2024-10-03 PROCEDURE — 99214 OFFICE O/P EST MOD 30 MIN: CPT | Mod: 25

## 2024-10-03 PROCEDURE — 90656 IIV3 VACC NO PRSV 0.5 ML IM: CPT

## 2024-10-03 PROCEDURE — 99395 PREV VISIT EST AGE 18-39: CPT

## 2024-10-03 RX ORDER — SERTRALINE 25 MG/1
25 TABLET, FILM COATED ORAL
Qty: 90 | Refills: 0 | Status: ACTIVE | COMMUNITY
Start: 2024-10-03 | End: 1900-01-01

## 2024-10-03 RX ORDER — SEGESTERONE ACETATE AND ETHINYL ESTRADIOL 17.4; 103 MG/1; MG/1
0.013-0.15 RING VAGINAL
Qty: 4 | Refills: 0 | Status: ACTIVE | COMMUNITY
Start: 2024-10-03

## 2024-10-03 RX ORDER — PANCRELIPASE 120000; 24000; 76000 [USP'U]/1; [USP'U]/1; [USP'U]/1
24000-76000 CAPSULE, DELAYED RELEASE PELLETS ORAL
Qty: 90 | Refills: 5 | Status: ACTIVE | COMMUNITY
Start: 2024-10-03

## 2024-10-03 NOTE — ASSESSMENT
[FreeTextEntry1] : Annual physical routine preventative care and immunizations discussed with the patient healthy lifestyle diet and exercises self breast exam Pap blood work checked.  EKG deferred Anemia iron deficiency supplement iron Constipation stable Migraines stable continue current regimen GERD/hiatal hernia stable continue diet modifications Abdominal pain accompanied by diarrhea has some improvement unremarkable labs.  Continue Creon consider taking Xifaxan as given by GI.  Discussed with the patient possibility of endometriosis contribution surgical consideration discussed with the patient if persist follow-up GI.  Given history of H. pylori will rule out recurrence urea breath test done today Endometriosis continue contraceptives follow-up GYN Pancreatic insufficiency some improvement continue Creon Anxiety started Zoloft 25 mg nightly continue Xanax as needed consider therapy Follow-up pending labs

## 2024-10-03 NOTE — HISTORY OF PRESENT ILLNESS
[FreeTextEntry1] : Annual physical [de-identified] : Patient presents for follow-up anemia status post bariatric surgery GERD migraines anxiety Patient is reporting few month history of recurrent diarrhea abdominal pain status post multiple visits to ER CT unremarkable unremarkable labs has consulted with multiple GIs latest diagnosis is pancreatic insufficiency was started on Creon some improvement in diarrhea however still has abdominal pain after eating.  No nausea vomiting fever minimal weight loss.  Differential diagnosis of SBO was given side effects did not start yet Anxiety patient is complaining of feeling anxious mostly overwhelmed with family environment feels that often interferes with activities of daily living.  No depression no insomnia.  Has been needing to take Xanax more often Patient was diagnosed with endometriosis and is currently on contraceptive ring with improvement in menstrual bleeding and pain Labs discussed with the patient ferritin 28

## 2024-10-03 NOTE — HEALTH RISK ASSESSMENT
[Patient reported PAP Smear was normal] : Patient reported PAP Smear was normal [PapSmearDate] : 2024

## 2024-10-05 LAB — UREA BREATH TEST QL: NEGATIVE

## 2025-01-16 LAB
M TB IFN-G BLD-IMP: NEGATIVE
QUANTIFERON TB PLUS MITOGEN MINUS NIL: >10 IU/ML
QUANTIFERON TB PLUS NIL: 0.03 IU/ML
QUANTIFERON TB PLUS TB1 MINUS NIL: 0.02 IU/ML
QUANTIFERON TB PLUS TB2 MINUS NIL: 0.02 IU/ML

## 2025-01-20 NOTE — H&P PST ADULT - NS PRO FEM REPRO PAP RESULTS
Health Maintenance       DTaP/Tdap/Td Vaccine (3 - Td or Tdap)  Overdue since 7/20/2023    Influenza Vaccine (1)  Never done    COVID-19 Vaccine (2 - 2024-25 season)  Overdue since 9/1/2024    Microalbumin Ratio (Yearly)  Ordered on 1/20/2025           Following review of the above:  Patient is not proceeding with: COVID-19, Dtap/Tdap/Td, and Influenza    Note: Refer to final orders and clinician documentation.       normal

## 2025-03-17 NOTE — ED PROVIDER NOTE - CHIEF COMPLAINT
The patient is a 31y Female complaining of abdominal pain. Patient requests all Lab, Cardiology, and Radiology Results on their Discharge Instructions

## 2025-04-03 ENCOUNTER — NON-APPOINTMENT (OUTPATIENT)
Age: 35
End: 2025-04-03

## 2025-04-03 ENCOUNTER — APPOINTMENT (OUTPATIENT)
Dept: INTERNAL MEDICINE | Facility: CLINIC | Age: 35
End: 2025-04-03
Payer: COMMERCIAL

## 2025-04-03 DIAGNOSIS — F41.9 ANXIETY DISORDER, UNSPECIFIED: ICD-10-CM

## 2025-04-03 DIAGNOSIS — G43.709 CHRONIC MIGRAINE W/OUT AURA, NOT INTRACTABLE, W/OUT STATUS MIGRAINOSUS: ICD-10-CM

## 2025-04-03 DIAGNOSIS — F45.8 OTHER SOMATOFORM DISORDERS: ICD-10-CM

## 2025-04-03 PROCEDURE — 99212 OFFICE O/P EST SF 10 MIN: CPT | Mod: 95

## 2025-05-31 DIAGNOSIS — F45.8 OTHER SOMATOFORM DISORDERS: ICD-10-CM

## 2025-05-31 RX ORDER — CYCLOBENZAPRINE 10 MG/1
10 TABLET ORAL
Qty: 14 | Refills: 0 | Status: ACTIVE | COMMUNITY
Start: 2025-05-31 | End: 1900-01-01

## 2025-06-13 ENCOUNTER — LABORATORY RESULT (OUTPATIENT)
Age: 35
End: 2025-06-13

## 2025-06-14 ENCOUNTER — APPOINTMENT (OUTPATIENT)
Dept: INTERNAL MEDICINE | Facility: CLINIC | Age: 35
End: 2025-06-14
Payer: COMMERCIAL

## 2025-06-14 ENCOUNTER — LABORATORY RESULT (OUTPATIENT)
Age: 35
End: 2025-06-14

## 2025-06-14 VITALS
TEMPERATURE: 98.1 F | SYSTOLIC BLOOD PRESSURE: 100 MMHG | OXYGEN SATURATION: 98 % | RESPIRATION RATE: 17 BRPM | WEIGHT: 128 LBS | BODY MASS INDEX: 23.55 KG/M2 | HEART RATE: 78 BPM | HEIGHT: 62 IN | DIASTOLIC BLOOD PRESSURE: 70 MMHG

## 2025-06-14 PROBLEM — Z11.3 SCREENING FOR STD (SEXUALLY TRANSMITTED DISEASE): Status: ACTIVE | Noted: 2025-06-14

## 2025-06-14 PROCEDURE — 36415 COLL VENOUS BLD VENIPUNCTURE: CPT

## 2025-06-14 PROCEDURE — 99213 OFFICE O/P EST LOW 20 MIN: CPT

## 2025-06-19 LAB
APPEARANCE: CLEAR
BILIRUBIN URINE: NEGATIVE
BLOOD URINE: ABNORMAL
CANDIDA VAG CYTO: NOT DETECTED
COLOR: NORMAL
G VAGINALIS+PREV SP MTYP VAG QL MICRO: NOT DETECTED
GLUCOSE QUALITATIVE U: NEGATIVE MG/DL
HCV AB SER QL: NONREACTIVE
HCV S/CO RATIO: 0.1 S/CO
HIV1+2 AB SPEC QL IA.RAPID: NONREACTIVE
KETONES URINE: 15 MG/DL
LEUKOCYTE ESTERASE URINE: NEGATIVE
NITRITE URINE: NEGATIVE
PH URINE: 6
PROTEIN URINE: NEGATIVE MG/DL
RPR SER-TITR: NORMAL
SPECIFIC GRAVITY URINE: 1.03
T VAGINALIS VAG QL WET PREP: NOT DETECTED
UROBILINOGEN URINE: 0.2 MG/DL

## 2025-06-25 PROBLEM — B37.31 YEAST INFECTION OF THE VAGINA: Status: ACTIVE | Noted: 2025-06-25 | Resolved: 2025-07-25

## 2025-06-25 RX ORDER — NYSTATIN 100000 [USP'U]/G
100000 CREAM TOPICAL 3 TIMES DAILY
Qty: 1 | Refills: 2 | Status: ACTIVE | COMMUNITY
Start: 2025-06-25 | End: 1900-01-01

## 2025-06-25 RX ORDER — FLUCONAZOLE 150 MG/1
150 TABLET ORAL
Qty: 2 | Refills: 0 | Status: ACTIVE | COMMUNITY
Start: 2025-06-25 | End: 1900-01-01

## 2025-07-07 NOTE — H&P PST ADULT - PROBLEM/PLAN-1
Physical Therapy    Visit Type: treatment  SUBJECTIVE  Pt just finished with OT and reports feeling pretty good   OBJECTIVE          Bed Mobility  - Sit to supine: supervision  Transfers  Assistive devices: 2-wheeled walker  - Sit to stand: supervision  - Stand to sit: contact guard/touching/steadying assist  Cues for hand placement  Ambulation / Gait  - Assistive device: 2-wheeled walker  - Distance (feet unless otherwise indicated): 150  - Assist Level: contact guard/touching/steadying assist      Interventions  Therapeutic Exercise  Quad sets x10 - use of towel for cues, increased time to process information  Heel slides x10  Hip abd/add x10  SLR 5x each- difficulty quad set  Knee fallout one at a time 10x each  Supine bridge - had pt lift and move to the right then the left-difficulty with follow through  Seated knee extension  Hip flexion    Skilled input: verbal instruction/cues  Verbal Consent: Writer verbally educated and received verbal consent for hand placement, positioning of patient, and techniques to be performed today from patient for therapist position for techniques as described above and how they are pertinent to the patient's plan of care.         Education:   - Present and ready to learn: patient  Education provided during session:  - Results of above outlined education: Needs reinforcement    ASSESSMENT   Pt with difficulty following directions not sure if from fatigue or cognition.  Pt would benefit from continued therapy to improve overall mobility prior to discharge.             Discharge Recommendations  PT/OT Mobility Equipment for Discharge: monitor need - may need 2ww or SPC - will continue to monitor  PT/OT ADL Equipment for Discharge: continue to monitor  PT Identified Barriers to Discharge: assist for mobility            Therapy Participation: This patient participated in all scheduled physical therapy time this session.    PLAN   Suggestions for next session as indicated: Progress  transfers and gait with 2ww vs progression of SPC or no device as appropriate. Standing tolerance. Stair training (flight of stairs to enter upper duplex). Static and dynamic balance tasks.     Family/caregiver training details:   Outcome measures: repeat 10MWT, MUSTAFA on discharge.           Interventions: PT Frequency: 5-7 days/week   Frequency Comments: 90min/day at least 5x/week   Duration: 7/17/2025        GOALS  Review date: 7/11/2025  Short Term Goals (STGs): to be met 7 days from date established, unless otherwise stated.  STGs = LTGs  Long Term Goals (LTGs): to be met by discharge from rehab program.   - Patient will complete all bed mobility at modified Independent level   - Patient will perform sit to/from stand at modified Independent level   - Patient will perform stand pivot transfers at modified Independent level with 2 wheeled walker   - Patient will ambulate 150 feet at modified Independent level with 2 wheeled walker   - Patient will negotiate 12 stairs at modified Independent level with no device and 1 railings      Patient at End of Session:   Location: in wheelchair  Handoff to: rehab aide/tech      Assistant to continue with current plan of care, current goals are appropriate, patient progressing towards set goals          Therapy procedure time and total treatment time can be found documented on the Time Entry flowsheet   DISPLAY PLAN FREE TEXT

## 2025-07-10 RX ORDER — SERTRALINE HYDROCHLORIDE 50 MG/1
50 TABLET, FILM COATED ORAL
Qty: 30 | Refills: 5 | Status: ACTIVE | COMMUNITY
Start: 2025-07-10 | End: 1900-01-01

## 2025-07-31 RX ORDER — HYOSCYAMINE SULFATE 0.12 MG/1
0.12 TABLET SUBLINGUAL 3 TIMES DAILY
Qty: 90 | Refills: 3 | Status: ACTIVE | COMMUNITY
Start: 2025-07-31 | End: 1900-01-01